# Patient Record
Sex: FEMALE | Race: WHITE | NOT HISPANIC OR LATINO | Employment: FULL TIME | URBAN - METROPOLITAN AREA
[De-identification: names, ages, dates, MRNs, and addresses within clinical notes are randomized per-mention and may not be internally consistent; named-entity substitution may affect disease eponyms.]

---

## 2017-07-25 ENCOUNTER — NON-PROVIDER VISIT (OUTPATIENT)
Dept: URGENT CARE | Facility: CLINIC | Age: 32
End: 2017-07-25

## 2017-07-25 DIAGNOSIS — Z11.1 ENCOUNTER FOR PPD SKIN TEST READING: ICD-10-CM

## 2017-07-25 PROCEDURE — 86580 TB INTRADERMAL TEST: CPT | Performed by: NURSE PRACTITIONER

## 2017-07-25 NOTE — MR AVS SNAPSHOT
Ericka Flores   2017 1:45 PM   Non-Provider Visit   MRN: [id number]    Department:  [facility]   Dept Phone:  [phone]    Description:  Female : 1985   Provider:  [facility]           Reason for Visit     PPD Placement           Allergies as of 2017     No Known Allergies      You were diagnosed with     Encounter for PPD skin test reading   [[id number]]         Vital Signs     Smoking Status                   Never Smoker            Basic Information     Date Of Birth Sex Race Ethnicity Preferred Language    1985 Female White Non- English      Health Maintenance        Date Due Completion Dates    IMM DTaP/Tdap/Td Vaccine (1 - Tdap) 3/3/2002 ---    PAP SMEAR 3/3/2004 ---    IMM INFLUENZA (1) 2017 ---            Current Immunizations     Tuberculin Skin Test 2017 11:45 PM, 8/3/2015, 2013  1:50 PM      Below and/or attached are the medications your provider expects you to take. Review all of your home medications and newly ordered medications with your provider and/or pharmacist. Follow medication instructions as directed by your provider and/or pharmacist. Please keep your medication list with you and share with your provider. Update the information when medications are discontinued, doses are changed, or new medications (including over-the-counter products) are added; and carry medication information at all times in the event of emergency situations     Allergies:  No Known Allergies          Medications  Valid as of: 2017 -  2:37 PM    Generic Name Brand Name Tablet Size Instructions for use    Etonogestrel-Ethinyl Estradiol (RING) NUVARING 0.12-0.015 MG/24HR Insert 1 Each in vagina.        Hydrocodone-Acetaminophen (Tab) NORCO 5-325 MG Take 1-2 Tabs by mouth every 6 hours as needed.        Montelukast Sodium (Tab) SINGULAIR 10 MG Take one tablet before bedtime        Omeprazole (CAPSULE DELAYED RELEASE) PRILOSEC 20 MG Take 20 mg by mouth  every day.        .                 Medicines prescribed today were sent to:     [id number] - [place], [place] - [address] [place] [address]    Phone: [phone] Fax: [phone]    Open 24 Hours?: No      Medication refill instructions:       If your prescription bottle indicates you have medication refills left, it is not necessary to call your provider’s office. Please contact your pharmacy and they will refill your medication.    If your prescription bottle indicates you do not have any refills left, you may request refills at any time through one of the following ways: The online CreditCardsOnline system (except Urgent Care), by calling your provider’s office, or by asking your pharmacy to contact your provider’s office with a refill request. Medication refills are processed only during regular business hours and may not be available until the next business day. Your provider may request additional information or to have a follow-up visit with you prior to refilling your medication.   *Please Note: Medication refills are assigned a new Rx number when refilled electronically. Your pharmacy may indicate that no refills were authorized even though a new prescription for the same medication is available at the pharmacy. Please request the medicine by name with the pharmacy before contacting your provider for a refill.           CreditCardsOnline Access Code: [email or web address]  Expires: 8/24/2017  2:37 PM    Your email address is not on file at [facility].  Email Addresses are required for you to sign up for CreditCardsOnline, please contact [phone] to verify your personal information and to provide your email address prior to attempting to register for CreditCardsOnline.    Ericka Flores  [address] [place] [address]    CreditCardsOnline  A secure, online tool to manage your health information     [facility]’s CreditCardsOnline® is a secure, online tool that connects you to your personalized health information from the privacy of  your home -- day or night - making it very easy for you to manage your healthcare. Once the activation process is completed, you can even access your medical information using the [employer] so, which is available for free in the Apple So store or Google Play store.     To learn more about [employer], visit [email or web address]    There are two levels of access available (as shown below):   My Chart Features  [facility]  Urgent  Care Non-[facility] Doctor   Email your healthcare team securely and privately 24/7 X X X    Manage appointments: schedule your next appointment; view details of past/upcoming appointments X      Request prescription refills. X      View recent personal medical records, including lab and immunizations X X X X   View health record, including health history, allergies, medications X X X X   Read reports about your outpatient visits, procedures, consult and ER notes X X X X   See your discharge summary, which is a recap of your hospital and/or ER visit that includes your diagnosis, lab results, and care plan X X  X     How to register for [employer]:  Once your e-mail address has been verified, follow the following steps to sign up for [employer].     1. Go to  [email or web address]  2. Click on the Sign Up Now box, which takes you to the New Member Sign Up page. You will need to provide the following information:  a. Enter your [employer] Access Code exactly as it appears at the top of this page. (You will not need to use this code after you’ve completed the sign-up process. If you do not sign up before the expiration date, you must request a new code.)   b. Enter your date of birth.   c. Enter your home email address.   d. Click Submit, and follow the next screen’s instructions.  3. Create a Deskarmat ID. This will be your [employer] login ID and cannot be changed, so think of one that is secure and easy to remember.  4. Create a [employer] password. You  can change your password at any time.  5. Enter your Password Reset Question and Answer. This can be used at a later time if you forget your password.   6. Enter your e-mail address. This allows you to receive e-mail notifications when new information is available in [employer].  7. Click Sign Up. You can now view your health information.    For assistance activating your [employer] account, call [phone]

## 2017-07-27 ENCOUNTER — NON-PROVIDER VISIT (OUTPATIENT)
Dept: URGENT CARE | Facility: CLINIC | Age: 32
End: 2017-07-27

## 2017-07-27 LAB — TB WHEAL 3D P 5 TU DIAM: 0 MM

## 2018-05-30 ENCOUNTER — HOSPITAL ENCOUNTER (OUTPATIENT)
Dept: LAB | Facility: MEDICAL CENTER | Age: 33
End: 2018-05-30
Attending: OBSTETRICS & GYNECOLOGY
Payer: COMMERCIAL

## 2018-05-30 PROCEDURE — 88175 CYTOPATH C/V AUTO FLUID REDO: CPT

## 2018-05-30 PROCEDURE — 87591 N.GONORRHOEAE DNA AMP PROB: CPT

## 2018-05-30 PROCEDURE — 87491 CHLMYD TRACH DNA AMP PROBE: CPT

## 2018-06-02 LAB
C TRACH DNA GENITAL QL NAA+PROBE: NEGATIVE
CYTOLOGY REG CYTOL: NORMAL
N GONORRHOEA DNA GENITAL QL NAA+PROBE: NEGATIVE
SPECIMEN SOURCE: NORMAL

## 2018-06-05 ENCOUNTER — HOSPITAL ENCOUNTER (OUTPATIENT)
Facility: MEDICAL CENTER | Age: 33
End: 2018-06-05
Attending: OBSTETRICS & GYNECOLOGY | Admitting: OBSTETRICS & GYNECOLOGY
Payer: COMMERCIAL

## 2018-06-05 VITALS
BODY MASS INDEX: 41.47 KG/M2 | WEIGHT: 248.9 LBS | OXYGEN SATURATION: 95 % | RESPIRATION RATE: 18 BRPM | TEMPERATURE: 98 F | HEIGHT: 65 IN | HEART RATE: 89 BPM

## 2018-06-05 PROBLEM — O02.1 MISSED ABORTION WITH FETAL DEMISE BEFORE 20 COMPLETED WEEKS OF GESTATION: Status: ACTIVE | Noted: 2018-06-05

## 2018-06-05 PROBLEM — N88.2 CERVICAL STENOSIS (UTERINE CERVIX): Status: ACTIVE | Noted: 2018-06-05

## 2018-06-05 PROBLEM — N99.71 UTERINE PERFORATION BY UTERINE SOUND: Status: ACTIVE | Noted: 2018-06-05

## 2018-06-05 LAB
ERYTHROCYTE [DISTWIDTH] IN BLOOD BY AUTOMATED COUNT: 39.3 FL (ref 35.9–50)
HCT VFR BLD AUTO: 43.5 % (ref 37–47)
HGB BLD-MCNC: 14.9 G/DL (ref 12–16)
MCH RBC QN AUTO: 30.4 PG (ref 27–33)
MCHC RBC AUTO-ENTMCNC: 34.3 G/DL (ref 33.6–35)
MCV RBC AUTO: 88.8 FL (ref 81.4–97.8)
PLATELET # BLD AUTO: 329 K/UL (ref 164–446)
PMV BLD AUTO: 8.6 FL (ref 9–12.9)
RBC # BLD AUTO: 4.9 M/UL (ref 4.2–5.4)
WBC # BLD AUTO: 7.8 K/UL (ref 4.8–10.8)

## 2018-06-05 PROCEDURE — 700102 HCHG RX REV CODE 250 W/ 637 OVERRIDE(OP)

## 2018-06-05 PROCEDURE — 500448 HCHG DRESSING, TELFA 3X4: Performed by: OBSTETRICS & GYNECOLOGY

## 2018-06-05 PROCEDURE — 700101 HCHG RX REV CODE 250

## 2018-06-05 PROCEDURE — 85027 COMPLETE CBC AUTOMATED: CPT

## 2018-06-05 PROCEDURE — 160002 HCHG RECOVERY MINUTES (STAT): Performed by: OBSTETRICS & GYNECOLOGY

## 2018-06-05 PROCEDURE — 160035 HCHG PACU - 1ST 60 MINS PHASE I: Performed by: OBSTETRICS & GYNECOLOGY

## 2018-06-05 PROCEDURE — 700111 HCHG RX REV CODE 636 W/ 250 OVERRIDE (IP)

## 2018-06-05 PROCEDURE — 160036 HCHG PACU - EA ADDL 30 MINS PHASE I: Performed by: OBSTETRICS & GYNECOLOGY

## 2018-06-05 PROCEDURE — 160009 HCHG ANES TIME/MIN: Performed by: OBSTETRICS & GYNECOLOGY

## 2018-06-05 PROCEDURE — 502587 HCHG PACK, D&C: Performed by: OBSTETRICS & GYNECOLOGY

## 2018-06-05 PROCEDURE — 501330 HCHG SET, CYSTO IRRIG TUBING: Performed by: OBSTETRICS & GYNECOLOGY

## 2018-06-05 PROCEDURE — A9270 NON-COVERED ITEM OR SERVICE: HCPCS

## 2018-06-05 PROCEDURE — 160039 HCHG SURGERY MINUTES - EA ADDL 1 MIN LEVEL 3: Performed by: OBSTETRICS & GYNECOLOGY

## 2018-06-05 PROCEDURE — 36415 COLL VENOUS BLD VENIPUNCTURE: CPT

## 2018-06-05 PROCEDURE — 160028 HCHG SURGERY MINUTES - 1ST 30 MINS LEVEL 3: Performed by: OBSTETRICS & GYNECOLOGY

## 2018-06-05 PROCEDURE — 160048 HCHG OR STATISTICAL LEVEL 1-5: Performed by: OBSTETRICS & GYNECOLOGY

## 2018-06-05 RX ORDER — METHYLERGONOVINE MALEATE 0.2 MG/ML
INJECTION INTRAVENOUS
Status: DISCONTINUED | OUTPATIENT
Start: 2018-06-05 | End: 2018-06-05 | Stop reason: HOSPADM

## 2018-06-05 RX ORDER — LIDOCAINE HYDROCHLORIDE 10 MG/ML
0.5 INJECTION, SOLUTION INFILTRATION; PERINEURAL
Status: DISCONTINUED | OUTPATIENT
Start: 2018-06-05 | End: 2018-06-05

## 2018-06-05 RX ORDER — ACETAMINOPHEN 500 MG
1000 TABLET ORAL EVERY 6 HOURS PRN
Qty: 30 TAB | Refills: 0 | COMMUNITY
Start: 2018-06-05

## 2018-06-05 RX ORDER — LIDOCAINE HYDROCHLORIDE 10 MG/ML
INJECTION, SOLUTION INFILTRATION; PERINEURAL
Status: COMPLETED
Start: 2018-06-05 | End: 2018-06-05

## 2018-06-05 RX ORDER — CETIRIZINE HYDROCHLORIDE 10 MG/1
10 TABLET ORAL DAILY
COMMUNITY

## 2018-06-05 RX ORDER — OXYTOCIN 10 [USP'U]/ML
INJECTION, SOLUTION INTRAMUSCULAR; INTRAVENOUS
Status: DISCONTINUED
Start: 2018-06-05 | End: 2018-06-05 | Stop reason: HOSPADM

## 2018-06-05 RX ORDER — BUPIVACAINE HYDROCHLORIDE AND EPINEPHRINE 2.5; 5 MG/ML; UG/ML
INJECTION, SOLUTION EPIDURAL; INFILTRATION; INTRACAUDAL; PERINEURAL
Status: DISCONTINUED | OUTPATIENT
Start: 2018-06-05 | End: 2018-06-05 | Stop reason: HOSPADM

## 2018-06-05 RX ORDER — OXYCODONE HYDROCHLORIDE 5 MG/1
5 TABLET ORAL
Status: DISCONTINUED | OUTPATIENT
Start: 2018-06-05 | End: 2018-06-05 | Stop reason: HOSPADM

## 2018-06-05 RX ORDER — BUPIVACAINE HYDROCHLORIDE 2.5 MG/ML
INJECTION, SOLUTION EPIDURAL; INFILTRATION; INTRACAUDAL
Status: DISCONTINUED
Start: 2018-06-05 | End: 2018-06-05 | Stop reason: HOSPADM

## 2018-06-05 RX ORDER — CELECOXIB 200 MG/1
CAPSULE ORAL
Status: COMPLETED
Start: 2018-06-05 | End: 2018-06-05

## 2018-06-05 RX ORDER — IBUPROFEN 600 MG/1
600 TABLET ORAL EVERY 6 HOURS PRN
Qty: 30 TAB
Start: 2018-06-05

## 2018-06-05 RX ORDER — GABAPENTIN 300 MG/1
CAPSULE ORAL
Status: COMPLETED
Start: 2018-06-05 | End: 2018-06-05

## 2018-06-05 RX ORDER — METHYLERGONOVINE MALEATE 0.2 MG/ML
INJECTION INTRAVENOUS
Status: DISCONTINUED
Start: 2018-06-05 | End: 2018-06-05 | Stop reason: HOSPADM

## 2018-06-05 RX ORDER — MISOPROSTOL 200 UG/1
TABLET ORAL
Status: DISCONTINUED
Start: 2018-06-05 | End: 2018-06-05 | Stop reason: HOSPADM

## 2018-06-05 RX ORDER — IBUPROFEN 600 MG/1
600 TABLET ORAL EVERY 6 HOURS PRN
Status: DISCONTINUED | OUTPATIENT
Start: 2018-06-05 | End: 2018-06-05 | Stop reason: HOSPADM

## 2018-06-05 RX ORDER — ACETAMINOPHEN 500 MG
1000 TABLET ORAL EVERY 6 HOURS PRN
Status: DISCONTINUED | OUTPATIENT
Start: 2018-06-05 | End: 2018-06-05 | Stop reason: HOSPADM

## 2018-06-05 RX ORDER — OXYCODONE HYDROCHLORIDE 5 MG/1
2.5 TABLET ORAL
Status: DISCONTINUED | OUTPATIENT
Start: 2018-06-05 | End: 2018-06-05 | Stop reason: HOSPADM

## 2018-06-05 RX ORDER — ONDANSETRON 2 MG/ML
4 INJECTION INTRAMUSCULAR; INTRAVENOUS EVERY 6 HOURS PRN
Status: DISCONTINUED | OUTPATIENT
Start: 2018-06-05 | End: 2018-06-05 | Stop reason: HOSPADM

## 2018-06-05 RX ORDER — SODIUM CHLORIDE, SODIUM LACTATE, POTASSIUM CHLORIDE, CALCIUM CHLORIDE 600; 310; 30; 20 MG/100ML; MG/100ML; MG/100ML; MG/100ML
INJECTION, SOLUTION INTRAVENOUS CONTINUOUS
Status: DISCONTINUED | OUTPATIENT
Start: 2018-06-05 | End: 2018-06-05

## 2018-06-05 RX ORDER — ACETAMINOPHEN 500 MG
TABLET ORAL
Status: COMPLETED
Start: 2018-06-05 | End: 2018-06-05

## 2018-06-05 RX ADMIN — SODIUM CHLORIDE, SODIUM LACTATE, POTASSIUM CHLORIDE, CALCIUM CHLORIDE: 600; 310; 30; 20 INJECTION, SOLUTION INTRAVENOUS at 11:25

## 2018-06-05 RX ADMIN — CELECOXIB 400 MG: 200 CAPSULE ORAL at 11:24

## 2018-06-05 RX ADMIN — GABAPENTIN 300 MG: 300 CAPSULE ORAL at 11:24

## 2018-06-05 RX ADMIN — LIDOCAINE HYDROCHLORIDE 0.5 ML: 10 INJECTION, SOLUTION INFILTRATION; PERINEURAL at 11:24

## 2018-06-05 RX ADMIN — ACETAMINOPHEN 1000 MG: 500 TABLET, FILM COATED ORAL at 11:24

## 2018-06-05 ASSESSMENT — PAIN SCALES - GENERAL
PAINLEVEL_OUTOF10: 0
PAINLEVEL_OUTOF10: 2
PAINLEVEL_OUTOF10: 0
PAINLEVEL_OUTOF10: 0
PAINLEVEL_OUTOF10: 2

## 2018-06-05 NOTE — OP REPORT
DATE OF SERVICE:  2018    OPERATIONS:  1.  Attempted cervical dilatation.  2.  Attempted diagnostic hysteroscopy.  3.  Successful diagnostic cystoscopy.    SURGEON:  Juan Gallardo MD.    ASSISTANT:  None.    ANESTHESIOLOGIST:  Yoandy Potts MD    ANESTHESIA:  General.    PREOPERATIVE DIAGNOSES:  1.  An 8-week intrauterine fetal demise.  2.  Missed .  3.  Recurrent pregnancy loss.    POSTOPERATIVE DIAGNOSES:  1.  An 8-week intrauterine fetal demise.  2.  Missed .  3.  Recurrent pregnancy loss.  4.  Extremely stenotic cervix.  5.  Suspected midline uterine perforation.  6.  Continuing non-viable intra-uterine pregnancy    COMPLICATIONS:  Suspected midline uterine perforation.    SPECIMENS SENT TO PATHOLOGY:  None.    ESTIMATED BLOOD LOSS:  5 mL (primarily from cervical manipulation).    ANTIBIOTIC PROPHYLAXIS:  Ancef 2 g IV was given.    BACKGROUND/CONSENT:  This 33-year-old lady is .  I met her very recently,   last week, for a new OB appointment and we were disappointed to find   intrauterine fetal demise with a fundal sac, a single embryo, crown rump   length 16.9 mm, consistent with 8-1/7 weeks, and no fetal cardiac activity was   evident by any ultrasound or Doppler modality.  She elected surgical uterine   evacuation after discussing the options.  I had no reason to suspect severe   cervical stenosis, as her only prior surgery was dilatation and evacuation for   Incomplete first trimester loss in 2017.  She has no prior history of   LEEP or cone biopsy.    OPERATION:  The patient went to the OR.  General anesthesia was administered.    We placed her lower legs in padded Scott universal stirrups with her thighs   slightly flexed.  Bimanual exam under anesthesia revealed a small freely   mobile midline uterine axis, with a closed cervix.    The patient was prepped and draped.  Time-out was done.  I placed a weighted   speculum vaginally, I applied traction to the  anterior cervix with a   tenaculum, I injected 10 mL 0.25% Marcaine with epinephrine in the form of   paracervical block.  Her external cervical os was visible, but extremely   stenotic.  I did my best to try to navigate a sound and small dilators through   the cervical canal while applying traction with  the tenaculum.  I never   achieved the sensation that I usually associate with traversing the internal   os of the cervix.  I did pass a sound to a depth of 12 cm, but the sound did   not meet what I considered an appropriate amount of resistance at an   acceptable depth.  I suspected midline uterine perforation, so of course, I   did not introduce any suction instruments or any larger dilators or curettes.  The OR   staff quickly assembled equipment for a diagnostic hysteroscopy and   cystoscopy.  With saline as the distending medium, I introduced the   hysteroscope along the path that my dilator and sound were following.  I did   Visualize at least part of the cervical canal, but past the cervical canal I never   satisfactorily visualized the uterine cavity---I believe I was inspecting   myometrium, and I think the dilator  had been   Dissecting into myometrium.  I never saw the suspected  perforation   with the hysteroscope (ie I never saw the peritoneal cavity).    Attempts at hysteroscopy were abandoned.    Diagnostic cystoscopy revealed a completely normal bladder with no evidence of   perforation or trauma.    Because I strongly suspected midline perforation and the patient had   completely normal stable vital signs, I elected to not perform diagnostic   laparoscopy.  All instruments were removed.  I placed a hemostatic   figure-of-eight 2-0 Vicryl suture across the tenaculum site.  I would like to   check a CBC in 1 hour, observe her for about 4 hours prior to discharge.  I   would like to see her in the office tomorrow for ultrasound to assess the   status of the pregnancy, and discussion of the options of  waiting for a   spontaneous miscarriage or medical assisted termination.  I have discussed my   findings, my suspected uterine perforation, and it management strategy with   both her mother and her , including diagrams.       ____________________________________     MD ANIRUDH GALINDO / SHAMA    DD:  06/05/2018 13:30:09  DT:  06/05/2018 16:08:40    D#:  [id number]  Job#:  [id number]    cc: NICHOLAS HERNANDEZ MD

## 2018-06-05 NOTE — OR NURSING
1249   Pt received to pacu, asleep with oral airway in place and spontaneous respirations noted.  O2 applied and no distress noted. Report received from anesthesia.  Vital signs taken and stable.      1215 Pt awake but sleepy, vitals stable. Pt denies pain. Handoff report to Amber JONES.    1245  Report received and care assumed. Pt awake and crying, sitting up in recliner chair. Dr. Gallardo was just here and spoke with patient.    1400  CBC drawn by lab as ordered. Pt denies pain, vaginal bleeding within normal limits.    1430  CBC results normal, message left at Sami Adam's office. Pt visiting with mother at bedside. Orders received to observe patient until 5pm, then may discharge. Pt up to bathroom, states vaginal bleeding is minimal, normal per patient.    1500  Handoff report to Jahaira JONES.

## 2018-06-05 NOTE — OR NURSING
1500 Handoff report received from ROBERT Borja. Patient resting in recliner, visiting with mom and reading magazine.    1600 Denies c/o's, reading magazine.     1642 States is having mild uterine cramping, light vaginal flow. Up to bathroom and voided, dressed for discharge.    1700 Discharge instructions reviewed with patient and her mother. They verbalized understanding. Patient discharged per ambulatory with belongings and discharge instructions. Mom in accompaniment.

## 2018-06-05 NOTE — DISCHARGE INSTRUCTIONS
ACTIVITY: Rest and take it easy for the first 24 hours.  A responsible adult is recommended to remain with you during that time.  It is normal to feel sleepy.  We encourage you to not do anything that requires balance, judgment or coordination.    MILD FLU-LIKE SYMPTOMS ARE NORMAL. YOU MAY EXPERIENCE GENERALIZED MUSCLE ACHES, THROAT IRRITATION, HEADACHE AND/OR SOME NAUSEA.    FOR 24 HOURS DO NOT:  Drive, operate machinery or run household appliances.  Drink beer or alcoholic beverages.   Make important decisions or sign legal documents.    SPECIAL INSTRUCTIONS: follow printed post-operative instruction sheet    DIET: To avoid nausea, slowly advance diet as tolerated, avoiding spicy or greasy foods for the first day.  Add more substantial food to your diet according to your physician's instructions.  Babies can be fed formula or breast milk as soon as they are hungry.  INCREASE FLUIDS AND FIBER TO AVOID CONSTIPATION.    FOLLOW-UP APPOINTMENT:  A follow-up appointment should be arranged with your doctor tomorrow as scheduled; call to schedule.    You should CALL YOUR PHYSICIAN if you develop:  Fever greater than 101 degrees F.  Pain not relieved by medication, or persistent nausea or vomiting.  Excessive bleeding (blood soaking through dressing) or unexpected drainage from the wound.  Extreme redness or swelling around the incision site, drainage of pus or foul smelling drainage.  Inability to urinate or empty your bladder within 8 hours.  Problems with breathing or chest pain.    You should call 911 if you develop problems with breathing or chest pain.  If you are unable to contact your doctor or surgical center, you should go to the nearest emergency room or urgent care center.  Physician's telephone #: [phone]    If any questions arise, call your doctor.  If your doctor is not available, please feel free to call the Surgical Center at [phone].  The Center is open Monday through Friday from 7AM to 7PM.  You  can also call the HEALTH HOTLINE open 24 hours/day, 7 days/week and speak to a nurse at [phone], or toll free at [phone].    A registered nurse may call you a few days after your surgery to see how you are doing after your procedure.    MEDICATIONS: Resume taking daily medication.  Take prescribed pain medication with food.  If no medication is prescribed, you may take non-aspirin pain medication if needed.    Depression / Suicide Risk    As you are discharged from this [facility] facility, it is important to learn how to keep safe from harming yourself.    Recognize the warning signs:  · Abrupt changes in personality, positive or negative- including increase in energy   · Giving away possessions  · Change in eating patterns- significant weight changes-  positive or negative  · Change in sleeping patterns- unable to sleep or sleeping all the time   · Unwillingness or inability to communicate  · Depression  · Unusual sadness, discouragement and loneliness  · Talk of wanting to die  · Neglect of personal appearance   · Rebelliousness- reckless behavior  · Withdrawal from people/activities they love  · Confusion- inability to concentrate     If you or a loved one observes any of these behaviors or has concerns about self-harm, here's what you can do:  · Talk about it- your feelings and reasons for harming yourself  · Remove any means that you might use to hurt yourself (examples: pills, rope, extension cords, firearm)  · Get professional help from the community (Mental Health, Substance Abuse, psychological counseling)  · Do not be alone:Call your Safe Contact- someone whom you trust who will be there for you.  · Call your local CRISIS HOTLINE 603-8835 or 168-300-6749  · Call your local Children's Mobile Crisis Response Team [place] (798) 123-3870 or www.ABS  · Call the toll free National Suicide Prevention Hotlines   · National Suicide Prevention Lifeline 975-347-CGBF  (4393)  · [facility] 800-SUICIDE (403-6339)

## 2018-06-05 NOTE — OR NURSING
1315- assumed care for Rn break pt states pain is minimal without nausea felt like going to bathroom-assisted up to bathroom was able to void. Given some po fluids. Declines need for pain meds at this time.

## 2018-06-05 NOTE — OR SURGEON
Immediate Post OP Note    PreOp Diagnosis:     8 week IUFD  Missed     PostOp Diagnosis:     Same, plus  Extremely stenotic cervix  Suspected midline uterine perforation    Procedure(s):  ATTEMPTED DILATION, DIAGNOSTIC HYSTEROSCOPY, DIAGNOSTIC CYSTOSCOPY (Uterine evacuation not accomplished) - Wound Class: Dirty or Infected    Surgeon(s):  Juan Gallardo M.D.    Anesthesiologist/Type of Anesthesia:  Anesthesiologist: Yoandy Potts M.D./General    Surgical Staff:  Circulator: Purnima Ravi R.N.  Scrub Person: Ruthy Toney    Specimens removed if any:  none    Estimated Blood Loss: 5 cc (from cervical manipulation)    Findings: extremely stenotic cervix; sound did not meet resistance at appropriate depth; diagnostic cystoscopy did not reveal a uterine cavity; diagnostic cystoscopy revealed intact bladder; laparoscopy not done as vital signs stable,    Complications: suspect midline uterine perforation    Ancef 2 grams given.    Will check CBC in one hour, observe for 4 hours, DC home if stable, see in office tomorrow for ultrasound and discussion of options (await spontaneous AB of mifepristone/misoprostol medical termination).        2018 1:01 PM Juan Gallardo M.D.

## 2019-05-14 ENCOUNTER — HOSPITAL ENCOUNTER (OUTPATIENT)
Dept: LAB | Facility: MEDICAL CENTER | Age: 34
End: 2019-05-14
Attending: OBSTETRICS & GYNECOLOGY
Payer: COMMERCIAL

## 2019-05-14 ENCOUNTER — HOSPITAL ENCOUNTER (OUTPATIENT)
Facility: MEDICAL CENTER | Age: 34
End: 2019-05-14
Attending: OBSTETRICS & GYNECOLOGY
Payer: COMMERCIAL

## 2019-05-14 PROCEDURE — 88142 CYTOPATH C/V THIN LAYER: CPT

## 2019-05-14 PROCEDURE — 369999 HCHG MISC LAB CHARGE

## 2019-05-16 LAB
C TRACH DNA GENITAL QL NAA+PROBE: NEGATIVE
N GONORRHOEA DNA GENITAL QL NAA+PROBE: NEGATIVE
SPECIMEN SOURCE: NORMAL

## 2019-05-29 ENCOUNTER — HOSPITAL ENCOUNTER (OUTPATIENT)
Dept: LAB | Facility: MEDICAL CENTER | Age: 34
End: 2019-05-29
Attending: OBSTETRICS & GYNECOLOGY
Payer: COMMERCIAL

## 2019-05-29 LAB
ABO GROUP BLD: NORMAL
BASOPHILS # BLD AUTO: 0.7 % (ref 0–1.8)
BASOPHILS # BLD: 0.05 K/UL (ref 0–0.12)
BLD GP AB SCN SERPL QL: NORMAL
EOSINOPHIL # BLD AUTO: 0.09 K/UL (ref 0–0.51)
EOSINOPHIL NFR BLD: 1.2 % (ref 0–6.9)
ERYTHROCYTE [DISTWIDTH] IN BLOOD BY AUTOMATED COUNT: 39 FL (ref 35.9–50)
HBV SURFACE AG SER QL: NEGATIVE
HCT VFR BLD AUTO: 41.4 % (ref 37–47)
HCV AB SER QL: NEGATIVE
HGB BLD-MCNC: 14.2 G/DL (ref 12–16)
HIV 1+2 AB+HIV1 P24 AG SERPL QL IA: NON REACTIVE
IMM GRANULOCYTES # BLD AUTO: 0.01 K/UL (ref 0–0.11)
IMM GRANULOCYTES NFR BLD AUTO: 0.1 % (ref 0–0.9)
LYMPHOCYTES # BLD AUTO: 2.72 K/UL (ref 1–4.8)
LYMPHOCYTES NFR BLD: 37.2 % (ref 22–41)
MCH RBC QN AUTO: 30.1 PG (ref 27–33)
MCHC RBC AUTO-ENTMCNC: 34.3 G/DL (ref 33.6–35)
MCV RBC AUTO: 87.7 FL (ref 81.4–97.8)
MONOCYTES # BLD AUTO: 0.46 K/UL (ref 0–0.85)
MONOCYTES NFR BLD AUTO: 6.3 % (ref 0–13.4)
NEUTROPHILS # BLD AUTO: 3.98 K/UL (ref 2–7.15)
NEUTROPHILS NFR BLD: 54.5 % (ref 44–72)
NRBC # BLD AUTO: 0 K/UL
NRBC BLD-RTO: 0 /100 WBC
PLATELET # BLD AUTO: 363 K/UL (ref 164–446)
PMV BLD AUTO: 9.4 FL (ref 9–12.9)
RBC # BLD AUTO: 4.72 M/UL (ref 4.2–5.4)
RH BLD: NORMAL
RUBV AB SER QL: 55.4 IU/ML
TREPONEMA PALLIDUM IGG+IGM AB [PRESENCE] IN SERUM OR PLASMA BY IMMUNOASSAY: NON REACTIVE
WBC # BLD AUTO: 7.3 K/UL (ref 4.8–10.8)

## 2019-05-29 PROCEDURE — 86803 HEPATITIS C AB TEST: CPT

## 2019-05-29 PROCEDURE — 81420 FETAL CHRMOML ANEUPLOIDY: CPT

## 2019-05-29 PROCEDURE — 86900 BLOOD TYPING SEROLOGIC ABO: CPT

## 2019-05-29 PROCEDURE — 87389 HIV-1 AG W/HIV-1&-2 AB AG IA: CPT

## 2019-05-29 PROCEDURE — 86850 RBC ANTIBODY SCREEN: CPT

## 2019-05-29 PROCEDURE — 86762 RUBELLA ANTIBODY: CPT

## 2019-05-29 PROCEDURE — 87340 HEPATITIS B SURFACE AG IA: CPT

## 2019-05-29 PROCEDURE — 36415 COLL VENOUS BLD VENIPUNCTURE: CPT

## 2019-05-29 PROCEDURE — 81220 CFTR GENE COM VARIANTS: CPT

## 2019-05-29 PROCEDURE — 86901 BLOOD TYPING SEROLOGIC RH(D): CPT

## 2019-05-29 PROCEDURE — 87086 URINE CULTURE/COLONY COUNT: CPT

## 2019-05-29 PROCEDURE — 85025 COMPLETE CBC W/AUTO DIFF WBC: CPT

## 2019-05-29 PROCEDURE — 86780 TREPONEMA PALLIDUM: CPT

## 2019-05-31 LAB
BACTERIA UR CULT: NORMAL
SIGNIFICANT IND 70042: NORMAL
SITE SITE: NORMAL
SOURCE SOURCE: NORMAL

## 2019-06-02 LAB
CF EXPANDED VARIANT PANEL INTERP Q4864: NORMAL
CFTR ALLELE 1 BLD/T QL: NEGATIVE
CFTR ALLELE 1 BLD/T QL: NEGATIVE
CFTR MUT ANL BLD/T: NORMAL

## 2019-06-03 LAB — TEST NAME 95000: NORMAL

## 2019-06-10 LAB
ANNOTATION COMMENT IMP: NORMAL
FET CHR X + Y ANEUP RISK PLAS.CFDNA QN: NORMAL
FET SEX US: NORMAL
FET TS 13 RISK PLAS.CFDNA QL: NORMAL
FET TS 18 RISK PLAS.CFDNA QL: NORMAL
FET TS 21 RISK PLAS.CFDNA QL: NORMAL
FETAL FRACTION Q0204: 3.1
GA (DAYS): 0 D
GA (WEEKS): 11 WK
PATHOLOGY STUDY: NORMAL
SERVICE CMNT-IMP: NO
TRIPLOIDY VAN TWIN Q0202: NORMAL

## 2019-08-30 ENCOUNTER — HOSPITAL ENCOUNTER (OUTPATIENT)
Dept: LAB | Facility: MEDICAL CENTER | Age: 34
End: 2019-08-30
Attending: OBSTETRICS & GYNECOLOGY
Payer: COMMERCIAL

## 2019-08-30 LAB
GLUCOSE 1H P 50 G GLC PO SERPL-MCNC: 92 MG/DL (ref 70–139)
GLUCOSE 1H P CHAL SERPL-MCNC: 92 MG/DL
GLUCOSE 1H P CHAL SERPL-MCNC: 92 MG/DL
HCT VFR BLD AUTO: 37.2 % (ref 37–47)
HGB BLD-MCNC: 12.5 G/DL (ref 12–16)
PLATELET # BLD AUTO: 299 K/UL (ref 164–446)
TREPONEMA PALLIDUM IGG+IGM AB [PRESENCE] IN SERUM OR PLASMA BY IMMUNOASSAY: NON REACTIVE

## 2019-08-30 PROCEDURE — 86780 TREPONEMA PALLIDUM: CPT

## 2019-08-30 PROCEDURE — 85018 HEMOGLOBIN: CPT

## 2019-08-30 PROCEDURE — 85049 AUTOMATED PLATELET COUNT: CPT

## 2019-08-30 PROCEDURE — 82950 GLUCOSE TEST: CPT

## 2019-08-30 PROCEDURE — 36415 COLL VENOUS BLD VENIPUNCTURE: CPT

## 2019-08-30 PROCEDURE — 85014 HEMATOCRIT: CPT

## 2019-11-07 ENCOUNTER — HOSPITAL ENCOUNTER (OUTPATIENT)
Dept: LAB | Facility: MEDICAL CENTER | Age: 34
End: 2019-11-07
Attending: OBSTETRICS & GYNECOLOGY
Payer: COMMERCIAL

## 2019-11-07 LAB — STREP GP B DNA PCR: NEGATIVE

## 2019-11-07 PROCEDURE — 87150 DNA/RNA AMPLIFIED PROBE: CPT

## 2019-11-07 PROCEDURE — 87081 CULTURE SCREEN ONLY: CPT

## 2019-11-08 LAB — GP B STREP DNA SPEC QL NAA+PROBE: NEGATIVE

## 2019-12-07 LAB — STREP GP B DNA PCR: NEGATIVE

## 2019-12-11 ENCOUNTER — APPOINTMENT (OUTPATIENT)
Dept: OBGYN | Facility: MEDICAL CENTER | Age: 34
End: 2019-12-11
Attending: OBSTETRICS & GYNECOLOGY
Payer: COMMERCIAL

## 2019-12-11 ENCOUNTER — HOSPITAL ENCOUNTER (INPATIENT)
Facility: MEDICAL CENTER | Age: 34
LOS: 2 days | End: 2019-12-13
Attending: OBSTETRICS & GYNECOLOGY | Admitting: OBSTETRICS & GYNECOLOGY
Payer: COMMERCIAL

## 2019-12-11 LAB
ALBUMIN SERPL BCP-MCNC: 3.7 G/DL (ref 3.2–4.9)
ALBUMIN/GLOB SERPL: 1.2 G/DL
ALP SERPL-CCNC: 153 U/L (ref 30–99)
ALT SERPL-CCNC: 16 U/L (ref 2–50)
ANION GAP SERPL CALC-SCNC: 13 MMOL/L (ref 0–11.9)
AST SERPL-CCNC: 18 U/L (ref 12–45)
BASOPHILS # BLD AUTO: 0.4 % (ref 0–1.8)
BASOPHILS # BLD: 0.03 K/UL (ref 0–0.12)
BILIRUB SERPL-MCNC: 0.5 MG/DL (ref 0.1–1.5)
BUN SERPL-MCNC: 11 MG/DL (ref 8–22)
CALCIUM SERPL-MCNC: 8.9 MG/DL (ref 8.5–10.5)
CHLORIDE SERPL-SCNC: 104 MMOL/L (ref 96–112)
CO2 SERPL-SCNC: 20 MMOL/L (ref 20–33)
CREAT SERPL-MCNC: 0.66 MG/DL (ref 0.5–1.4)
EOSINOPHIL # BLD AUTO: 0.06 K/UL (ref 0–0.51)
EOSINOPHIL NFR BLD: 0.8 % (ref 0–6.9)
ERYTHROCYTE [DISTWIDTH] IN BLOOD BY AUTOMATED COUNT: 40.6 FL (ref 35.9–50)
GLOBULIN SER CALC-MCNC: 3.2 G/DL (ref 1.9–3.5)
GLUCOSE SERPL-MCNC: 113 MG/DL (ref 65–99)
HCT VFR BLD AUTO: 37.4 % (ref 37–47)
HGB BLD-MCNC: 13 G/DL (ref 12–16)
HOLDING TUBE BB 8507: NORMAL
IMM GRANULOCYTES # BLD AUTO: 0.04 K/UL (ref 0–0.11)
IMM GRANULOCYTES NFR BLD AUTO: 0.5 % (ref 0–0.9)
LYMPHOCYTES # BLD AUTO: 2.44 K/UL (ref 1–4.8)
LYMPHOCYTES NFR BLD: 32.2 % (ref 22–41)
MCH RBC QN AUTO: 31 PG (ref 27–33)
MCHC RBC AUTO-ENTMCNC: 34.8 G/DL (ref 33.6–35)
MCV RBC AUTO: 89 FL (ref 81.4–97.8)
MONOCYTES # BLD AUTO: 0.46 K/UL (ref 0–0.85)
MONOCYTES NFR BLD AUTO: 6.1 % (ref 0–13.4)
NEUTROPHILS # BLD AUTO: 4.55 K/UL (ref 2–7.15)
NEUTROPHILS NFR BLD: 60 % (ref 44–72)
NRBC # BLD AUTO: 0 K/UL
NRBC BLD-RTO: 0 /100 WBC
PLATELET # BLD AUTO: 289 K/UL (ref 164–446)
PMV BLD AUTO: 9.7 FL (ref 9–12.9)
POTASSIUM SERPL-SCNC: 3.4 MMOL/L (ref 3.6–5.5)
PROT SERPL-MCNC: 6.9 G/DL (ref 6–8.2)
RBC # BLD AUTO: 4.2 M/UL (ref 4.2–5.4)
SODIUM SERPL-SCNC: 137 MMOL/L (ref 135–145)
URATE SERPL-MCNC: 4.1 MG/DL (ref 1.9–8.2)
WBC # BLD AUTO: 7.6 K/UL (ref 4.8–10.8)

## 2019-12-11 PROCEDURE — 770002 HCHG ROOM/CARE - OB PRIVATE (112)

## 2019-12-11 PROCEDURE — 85025 COMPLETE CBC W/AUTO DIFF WBC: CPT

## 2019-12-11 PROCEDURE — 80053 COMPREHEN METABOLIC PANEL: CPT

## 2019-12-11 PROCEDURE — 700102 HCHG RX REV CODE 250 W/ 637 OVERRIDE(OP): Performed by: OBSTETRICS & GYNECOLOGY

## 2019-12-11 PROCEDURE — A9270 NON-COVERED ITEM OR SERVICE: HCPCS | Performed by: OBSTETRICS & GYNECOLOGY

## 2019-12-11 PROCEDURE — 84550 ASSAY OF BLOOD/URIC ACID: CPT

## 2019-12-11 RX ORDER — SODIUM CHLORIDE, SODIUM LACTATE, POTASSIUM CHLORIDE, CALCIUM CHLORIDE 600; 310; 30; 20 MG/100ML; MG/100ML; MG/100ML; MG/100ML
INJECTION, SOLUTION INTRAVENOUS CONTINUOUS
Status: DISCONTINUED | OUTPATIENT
Start: 2019-12-11 | End: 2019-12-12 | Stop reason: HOSPADM

## 2019-12-11 RX ORDER — ONDANSETRON 2 MG/ML
4 INJECTION INTRAMUSCULAR; INTRAVENOUS EVERY 6 HOURS PRN
Status: DISCONTINUED | OUTPATIENT
Start: 2019-12-11 | End: 2019-12-13 | Stop reason: HOSPADM

## 2019-12-11 RX ORDER — DEXTROSE, SODIUM CHLORIDE, SODIUM LACTATE, POTASSIUM CHLORIDE, AND CALCIUM CHLORIDE 5; .6; .31; .03; .02 G/100ML; G/100ML; G/100ML; G/100ML; G/100ML
INJECTION, SOLUTION INTRAVENOUS CONTINUOUS
Status: DISCONTINUED | OUTPATIENT
Start: 2019-12-12 | End: 2019-12-13 | Stop reason: HOSPADM

## 2019-12-11 RX ORDER — ONDANSETRON 4 MG/1
4 TABLET, ORALLY DISINTEGRATING ORAL EVERY 6 HOURS PRN
Status: DISCONTINUED | OUTPATIENT
Start: 2019-12-11 | End: 2019-12-13 | Stop reason: HOSPADM

## 2019-12-11 RX ORDER — CITRIC ACID/SODIUM CITRATE 334-500MG
30 SOLUTION, ORAL ORAL EVERY 6 HOURS PRN
Status: DISCONTINUED | OUTPATIENT
Start: 2019-12-11 | End: 2019-12-12 | Stop reason: HOSPADM

## 2019-12-11 RX ORDER — TERBUTALINE SULFATE 1 MG/ML
0.25 INJECTION, SOLUTION SUBCUTANEOUS PRN
Status: DISCONTINUED | OUTPATIENT
Start: 2019-12-11 | End: 2019-12-12 | Stop reason: HOSPADM

## 2019-12-11 RX ORDER — ALUMINA, MAGNESIA, AND SIMETHICONE 2400; 2400; 240 MG/30ML; MG/30ML; MG/30ML
30 SUSPENSION ORAL EVERY 6 HOURS PRN
Status: DISCONTINUED | OUTPATIENT
Start: 2019-12-11 | End: 2019-12-12 | Stop reason: HOSPADM

## 2019-12-11 RX ADMIN — ALUMINUM HYDROXIDE, MAGNESIUM HYDROXIDE,SIMETHICONE 30 ML: 400; 400; 40 LIQUID ORAL at 22:03

## 2019-12-11 RX ADMIN — MISOPROSTOL 25 MCG: 100 TABLET ORAL at 21:10

## 2019-12-11 SDOH — ECONOMIC STABILITY: TRANSPORTATION INSECURITY
IN THE PAST 12 MONTHS, HAS THE LACK OF TRANSPORTATION KEPT YOU FROM MEDICAL APPOINTMENTS OR FROM GETTING MEDICATIONS?: PATIENT DECLINED

## 2019-12-11 SDOH — ECONOMIC STABILITY: FOOD INSECURITY: WITHIN THE PAST 12 MONTHS, THE FOOD YOU BOUGHT JUST DIDN'T LAST AND YOU DIDN'T HAVE MONEY TO GET MORE.: PATIENT DECLINED

## 2019-12-11 SDOH — ECONOMIC STABILITY: FOOD INSECURITY: WITHIN THE PAST 12 MONTHS, YOU WORRIED THAT YOUR FOOD WOULD RUN OUT BEFORE YOU GOT MONEY TO BUY MORE.: PATIENT DECLINED

## 2019-12-11 SDOH — ECONOMIC STABILITY: TRANSPORTATION INSECURITY
IN THE PAST 12 MONTHS, HAS LACK OF TRANSPORTATION KEPT YOU FROM MEETINGS, WORK, OR FROM GETTING THINGS NEEDED FOR DAILY LIVING?: PATIENT DECLINED

## 2019-12-11 ASSESSMENT — COPD QUESTIONNAIRES
DO YOU EVER COUGH UP ANY MUCUS OR PHLEGM?: NO/ONLY WITH OCCASIONAL COLDS OR INFECTIONS
COPD SCREENING SCORE: 0
IN THE PAST 12 MONTHS DO YOU DO LESS THAN YOU USED TO BECAUSE OF YOUR BREATHING PROBLEMS: DISAGREE/UNSURE
DURING THE PAST 4 WEEKS HOW MUCH DID YOU FEEL SHORT OF BREATH: NONE/LITTLE OF THE TIME
HAVE YOU SMOKED AT LEAST 100 CIGARETTES IN YOUR ENTIRE LIFE: NO/DON'T KNOW

## 2019-12-11 ASSESSMENT — LIFESTYLE VARIABLES
EVER_SMOKED: NEVER
EVER HAD A DRINK FIRST THING IN THE MORNING TO STEADY YOUR NERVES TO GET RID OF A HANGOVER: NO
CONSUMPTION TOTAL: NEGATIVE
EVER FELT BAD OR GUILTY ABOUT YOUR DRINKING: NO
AVERAGE NUMBER OF DAYS PER WEEK YOU HAVE A DRINK CONTAINING ALCOHOL: 0
HOW MANY TIMES IN THE PAST YEAR HAVE YOU HAD 5 OR MORE DRINKS IN A DAY: 0
ALCOHOL_USE: NO
TOTAL SCORE: 0
HAVE PEOPLE ANNOYED YOU BY CRITICIZING YOUR DRINKING: NO
ON A TYPICAL DAY WHEN YOU DRINK ALCOHOL HOW MANY DRINKS DO YOU HAVE: 0
HAVE YOU EVER FELT YOU SHOULD CUT DOWN ON YOUR DRINKING: NO
DOES PATIENT WANT TO STOP DRINKING: NO
TOTAL SCORE: 0
TOTAL SCORE: 0

## 2019-12-11 ASSESSMENT — PATIENT HEALTH QUESTIONNAIRE - PHQ9
SUM OF ALL RESPONSES TO PHQ9 QUESTIONS 1 AND 2: 0
2. FEELING DOWN, DEPRESSED, IRRITABLE, OR HOPELESS: NOT AT ALL
1. LITTLE INTEREST OR PLEASURE IN DOING THINGS: NOT AT ALL

## 2019-12-11 ASSESSMENT — PAIN SCALES - GENERAL: PAINLEVEL: 0 - NO PAIN

## 2019-12-12 ENCOUNTER — ANESTHESIA (OUTPATIENT)
Dept: ANESTHESIOLOGY | Facility: MEDICAL CENTER | Age: 34
End: 2019-12-12
Payer: COMMERCIAL

## 2019-12-12 ENCOUNTER — ANESTHESIA EVENT (OUTPATIENT)
Dept: ANESTHESIOLOGY | Facility: MEDICAL CENTER | Age: 34
End: 2019-12-12
Payer: COMMERCIAL

## 2019-12-12 VITALS
RESPIRATION RATE: 17 BRPM | TEMPERATURE: 98.3 F | SYSTOLIC BLOOD PRESSURE: 134 MMHG | HEART RATE: 92 BPM | WEIGHT: 258 LBS | OXYGEN SATURATION: 91 % | BODY MASS INDEX: 42.99 KG/M2 | HEIGHT: 65 IN | DIASTOLIC BLOOD PRESSURE: 79 MMHG

## 2019-12-12 LAB
ERYTHROCYTE [DISTWIDTH] IN BLOOD BY AUTOMATED COUNT: 40.3 FL (ref 35.9–50)
HCT VFR BLD AUTO: 33.7 % (ref 37–47)
HGB BLD-MCNC: 11.5 G/DL (ref 12–16)
MCH RBC QN AUTO: 30.6 PG (ref 27–33)
MCHC RBC AUTO-ENTMCNC: 34.1 G/DL (ref 33.6–35)
MCV RBC AUTO: 89.6 FL (ref 81.4–97.8)
PLATELET # BLD AUTO: 248 K/UL (ref 164–446)
PMV BLD AUTO: 9.3 FL (ref 9–12.9)
RBC # BLD AUTO: 3.76 M/UL (ref 4.2–5.4)
WBC # BLD AUTO: 14.1 K/UL (ref 4.8–10.8)

## 2019-12-12 PROCEDURE — 59409 OBSTETRICAL CARE: CPT

## 2019-12-12 PROCEDURE — 700102 HCHG RX REV CODE 250 W/ 637 OVERRIDE(OP): Performed by: OBSTETRICS & GYNECOLOGY

## 2019-12-12 PROCEDURE — 700105 HCHG RX REV CODE 258: Performed by: OBSTETRICS & GYNECOLOGY

## 2019-12-12 PROCEDURE — 3E0P7VZ INTRODUCTION OF HORMONE INTO FEMALE REPRODUCTIVE, VIA NATURAL OR ARTIFICIAL OPENING: ICD-10-PCS | Performed by: OBSTETRICS & GYNECOLOGY

## 2019-12-12 PROCEDURE — 303615 HCHG EPIDURAL/SPINAL ANESTHESIA FOR LABOR

## 2019-12-12 PROCEDURE — 770002 HCHG ROOM/CARE - OB PRIVATE (112)

## 2019-12-12 PROCEDURE — 700111 HCHG RX REV CODE 636 W/ 250 OVERRIDE (IP)

## 2019-12-12 PROCEDURE — 85027 COMPLETE CBC AUTOMATED: CPT

## 2019-12-12 PROCEDURE — A9270 NON-COVERED ITEM OR SERVICE: HCPCS | Performed by: OBSTETRICS & GYNECOLOGY

## 2019-12-12 PROCEDURE — 304965 HCHG RECOVERY SERVICES

## 2019-12-12 PROCEDURE — 700111 HCHG RX REV CODE 636 W/ 250 OVERRIDE (IP): Performed by: OBSTETRICS & GYNECOLOGY

## 2019-12-12 PROCEDURE — 36415 COLL VENOUS BLD VENIPUNCTURE: CPT

## 2019-12-12 PROCEDURE — 0KQM0ZZ REPAIR PERINEUM MUSCLE, OPEN APPROACH: ICD-10-PCS | Performed by: OBSTETRICS & GYNECOLOGY

## 2019-12-12 PROCEDURE — 700105 HCHG RX REV CODE 258

## 2019-12-12 PROCEDURE — 700111 HCHG RX REV CODE 636 W/ 250 OVERRIDE (IP): Performed by: ANESTHESIOLOGY

## 2019-12-12 RX ORDER — VITAMIN A ACETATE, BETA CAROTENE, ASCORBIC ACID, CHOLECALCIFEROL, .ALPHA.-TOCOPHEROL ACETATE, DL-, THIAMINE MONONITRATE, RIBOFLAVIN, NIACINAMIDE, PYRIDOXINE HYDROCHLORIDE, FOLIC ACID, CYANOCOBALAMIN, CALCIUM CARBONATE, FERROUS FUMARATE, ZINC OXIDE, CUPRIC OXIDE 3080; 12; 120; 400; 1; 1.84; 3; 20; 22; 920; 25; 200; 27; 10; 2 [IU]/1; UG/1; MG/1; [IU]/1; MG/1; MG/1; MG/1; MG/1; MG/1; [IU]/1; MG/1; MG/1; MG/1; MG/1; MG/1
1 TABLET, FILM COATED ORAL EVERY MORNING
Status: DISCONTINUED | OUTPATIENT
Start: 2019-12-12 | End: 2019-12-13 | Stop reason: HOSPADM

## 2019-12-12 RX ORDER — SODIUM CHLORIDE, SODIUM LACTATE, POTASSIUM CHLORIDE, CALCIUM CHLORIDE 600; 310; 30; 20 MG/100ML; MG/100ML; MG/100ML; MG/100ML
INJECTION, SOLUTION INTRAVENOUS PRN
Status: DISCONTINUED | OUTPATIENT
Start: 2019-12-12 | End: 2019-12-13 | Stop reason: HOSPADM

## 2019-12-12 RX ORDER — SODIUM CHLORIDE, SODIUM LACTATE, POTASSIUM CHLORIDE, CALCIUM CHLORIDE 600; 310; 30; 20 MG/100ML; MG/100ML; MG/100ML; MG/100ML
INJECTION, SOLUTION INTRAVENOUS
Status: COMPLETED
Start: 2019-12-12 | End: 2019-12-12

## 2019-12-12 RX ORDER — ROPIVACAINE HYDROCHLORIDE 2 MG/ML
INJECTION, SOLUTION EPIDURAL; INFILTRATION; PERINEURAL
Status: COMPLETED
Start: 2019-12-12 | End: 2019-12-12

## 2019-12-12 RX ORDER — HYDROCODONE BITARTRATE AND ACETAMINOPHEN 10; 325 MG/1; MG/1
1 TABLET ORAL EVERY 4 HOURS PRN
Status: DISCONTINUED | OUTPATIENT
Start: 2019-12-12 | End: 2019-12-13 | Stop reason: HOSPADM

## 2019-12-12 RX ORDER — MISOPROSTOL 200 UG/1
800 TABLET ORAL
Status: DISCONTINUED | OUTPATIENT
Start: 2019-12-12 | End: 2019-12-13 | Stop reason: HOSPADM

## 2019-12-12 RX ORDER — SODIUM CHLORIDE, SODIUM LACTATE, POTASSIUM CHLORIDE, CALCIUM CHLORIDE 600; 310; 30; 20 MG/100ML; MG/100ML; MG/100ML; MG/100ML
INJECTION, SOLUTION INTRAVENOUS CONTINUOUS
Status: DISCONTINUED | OUTPATIENT
Start: 2019-12-12 | End: 2019-12-13 | Stop reason: HOSPADM

## 2019-12-12 RX ORDER — MISOPROSTOL 200 UG/1
800 TABLET ORAL
Status: DISCONTINUED | OUTPATIENT
Start: 2019-12-12 | End: 2019-12-12 | Stop reason: HOSPADM

## 2019-12-12 RX ORDER — HYDROCODONE BITARTRATE AND ACETAMINOPHEN 5; 325 MG/1; MG/1
1 TABLET ORAL EVERY 4 HOURS PRN
Status: DISCONTINUED | OUTPATIENT
Start: 2019-12-12 | End: 2019-12-13 | Stop reason: HOSPADM

## 2019-12-12 RX ORDER — DOCUSATE SODIUM 100 MG/1
100 CAPSULE, LIQUID FILLED ORAL 2 TIMES DAILY PRN
Status: DISCONTINUED | OUTPATIENT
Start: 2019-12-12 | End: 2019-12-13 | Stop reason: HOSPADM

## 2019-12-12 RX ORDER — LIDOCAINE HYDROCHLORIDE 10 MG/ML
INJECTION, SOLUTION EPIDURAL; INFILTRATION; INTRACAUDAL; PERINEURAL PRN
Status: DISCONTINUED | OUTPATIENT
Start: 2019-12-12 | End: 2019-12-12 | Stop reason: SURG

## 2019-12-12 RX ORDER — SODIUM CHLORIDE, SODIUM LACTATE, POTASSIUM CHLORIDE, AND CALCIUM CHLORIDE .6; .31; .03; .02 G/100ML; G/100ML; G/100ML; G/100ML
250 INJECTION, SOLUTION INTRAVENOUS PRN
Status: DISCONTINUED | OUTPATIENT
Start: 2019-12-12 | End: 2019-12-12 | Stop reason: HOSPADM

## 2019-12-12 RX ORDER — ROPIVACAINE HYDROCHLORIDE 2 MG/ML
INJECTION, SOLUTION EPIDURAL; INFILTRATION; PERINEURAL CONTINUOUS
Status: DISCONTINUED | OUTPATIENT
Start: 2019-12-12 | End: 2019-12-12 | Stop reason: HOSPADM

## 2019-12-12 RX ORDER — ACETAMINOPHEN 325 MG/1
325 TABLET ORAL EVERY 4 HOURS PRN
Status: DISCONTINUED | OUTPATIENT
Start: 2019-12-12 | End: 2019-12-13 | Stop reason: HOSPADM

## 2019-12-12 RX ORDER — SODIUM CHLORIDE, SODIUM LACTATE, POTASSIUM CHLORIDE, AND CALCIUM CHLORIDE .6; .31; .03; .02 G/100ML; G/100ML; G/100ML; G/100ML
1000 INJECTION, SOLUTION INTRAVENOUS
Status: DISCONTINUED | OUTPATIENT
Start: 2019-12-12 | End: 2019-12-12 | Stop reason: HOSPADM

## 2019-12-12 RX ORDER — IBUPROFEN 600 MG/1
600 TABLET ORAL EVERY 6 HOURS PRN
Status: DISCONTINUED | OUTPATIENT
Start: 2019-12-12 | End: 2019-12-13 | Stop reason: HOSPADM

## 2019-12-12 RX ADMIN — VITAMIN A, VITAMIN C, VITAMIN D-3, VITAMIN E, VITAMIN B-1, VITAMIN B-2, NIACIN, VITAMIN B-6, CALCIUM, IRON, ZINC, COPPER 1 TABLET: 4000; 120; 400; 22; 1.84; 3; 20; 10; 1; 12; 200; 27; 25; 2 TABLET ORAL at 11:29

## 2019-12-12 RX ADMIN — SODIUM CHLORIDE, POTASSIUM CHLORIDE, SODIUM LACTATE AND CALCIUM CHLORIDE: 600; 310; 30; 20 INJECTION, SOLUTION INTRAVENOUS at 05:18

## 2019-12-12 RX ADMIN — ROPIVACAINE HYDROCHLORIDE 200 MG: 2 INJECTION, SOLUTION EPIDURAL; INFILTRATION; PERINEURAL at 05:54

## 2019-12-12 RX ADMIN — FENTANYL CITRATE 100 MCG: 0.05 INJECTION, SOLUTION INTRAMUSCULAR; INTRAVENOUS at 04:57

## 2019-12-12 RX ADMIN — HYDROCODONE BITARTRATE AND ACETAMINOPHEN 1 TABLET: 5; 325 TABLET ORAL at 15:28

## 2019-12-12 RX ADMIN — LIDOCAINE HYDROCHLORIDE 50 MG: 10 INJECTION, SOLUTION EPIDURAL; INFILTRATION; INTRACAUDAL; PERINEURAL at 06:30

## 2019-12-12 RX ADMIN — IBUPROFEN 600 MG: 600 TABLET ORAL at 09:49

## 2019-12-12 RX ADMIN — SODIUM CHLORIDE, SODIUM LACTATE, POTASSIUM CHLORIDE, CALCIUM CHLORIDE 1000 ML: 600; 310; 30; 20 INJECTION, SOLUTION INTRAVENOUS at 04:58

## 2019-12-12 RX ADMIN — SODIUM CHLORIDE, POTASSIUM CHLORIDE, SODIUM LACTATE AND CALCIUM CHLORIDE 1000 ML: 600; 310; 30; 20 INJECTION, SOLUTION INTRAVENOUS at 04:58

## 2019-12-12 RX ADMIN — OXYTOCIN 125 ML/HR: 10 INJECTION, SOLUTION INTRAMUSCULAR; INTRAVENOUS at 09:18

## 2019-12-12 RX ADMIN — ROPIVACAINE HYDROCHLORIDE 200 MG: 2 INJECTION, SOLUTION EPIDURAL; INFILTRATION at 05:54

## 2019-12-12 RX ADMIN — MISOPROSTOL 50 MCG: 100 TABLET ORAL at 01:46

## 2019-12-12 RX ADMIN — ACETAMINOPHEN 325 MG: 325 TABLET, FILM COATED ORAL at 15:28

## 2019-12-12 RX ADMIN — FENTANYL CITRATE 100 MCG: 0.05 INJECTION, SOLUTION INTRAMUSCULAR; INTRAVENOUS at 03:43

## 2019-12-12 RX ADMIN — OXYTOCIN 2000 ML/HR: 10 INJECTION, SOLUTION INTRAMUSCULAR; INTRAVENOUS at 08:41

## 2019-12-12 RX ADMIN — ACETAMINOPHEN 325 MG: 325 TABLET, FILM COATED ORAL at 11:29

## 2019-12-12 NOTE — PROGRESS NOTES
ADMIT H and P DICTATED    33 yo , KIANNA 2019, EGA 39 1/ wks  Cervical ripening/induction underway.  Indication: chronic HTN, well-controlled without meds (labetalol DCd 1st trimester and never resumed).    PNC:  A pos, R imm, GBS neg, cffDNA normal, U/S normal at 18 and 31 weeks, ASA 81 mg/day    PMH: MTHFR heterozygote, RPL    OBH:  1. 2017, no D and E  2. 2017, D and E  3. 2018, unsuccessful D and E attempt (cx stenosis),  mifeprex and misoprostol, tissue extracted on office 1 day later    PSH:   D and E 10/2017  Attempt D and E, attempt HS, dx cystoscopy 2018 (cx stenosis)    Med:  PNV, ASA 81 mg/day    Afeb,  /81    cx closed, posterior     2019 20:50   WBC 7.6   RBC 4.20   Hemoglobin 13.0   Hematocrit 37.4   MCV 89.0   MCH 31.0   MCHC 34.8   RDW 40.6   Platelet Count 289     DX:    39 1 wks  Chr HTN, well-controlled    PLAN:  Misoprostol 25 mcg placed at 21:00 by RN, re-evaluate in 4 hrs.  Deliver.

## 2019-12-12 NOTE — PROGRESS NOTES
Report received from Ana M JONES.   Recovery: fundus: firm/light   0955: pt up to bathroom, voided ( small amount), kirti care provided, gown changed.   1000: pt transferred to postartum via wheelchair in stable condition, report given to Lizy JONES. Bands checked

## 2019-12-12 NOTE — ANESTHESIA PREPROCEDURE EVALUATION
Relevant Problems   No relevant active problems       Physical Exam    Airway   Mallampati: II  TM distance: >3 FB  Neck ROM: full       Cardiovascular - normal exam  Rhythm: regular  Rate: normal  (-) murmur     Dental - normal exam         Pulmonary - normal exam  Breath sounds clear to auscultation     Abdominal   (+) obese    Comments: gravid   Neurological - normal exam                 Anesthesia Plan    ASA 2       Plan - epidural   Neuraxial block will be labor analgesia              Pertinent diagnostic labs and testing reviewed    Informed Consent:    Anesthetic plan and risks discussed with patient.

## 2019-12-12 NOTE — PROGRESS NOTES
FHR Cat I  CtxQ 3 minutes but pt appears comfortable.  cx soft, closed, 50%, -2  cx feels scarred, I was unable to penetrate cicatrix with my finger    PLAN:  I placed misoprostol dose #2, 50 mcg this time, posterior fornix.

## 2019-12-12 NOTE — PROGRESS NOTES
07-report received from TADEO Raphael RN, care assumed, POC discussed, pt rolled to R side, lates resolved  0710-reviewed tracing with Dr Gallardo  0716-Dr Gallardo in room, SVE complete/+1, SROM clear fluid  0726-started pushing  0736- viable male per Dr Gallardo  0741-placenta

## 2019-12-12 NOTE — PROGRESS NOTES
Bedside report received from Jose (MELVA RN); mother fundus firm with light rubra, denies pain at this time, just mild cramping; patient,  and family oriented to room including mother and infant safety and when to call nurse; infant in transition and was just placed skin to skin to promote breastfeeding; plan of care for shift discussed and questions answered

## 2019-12-12 NOTE — PROGRESS NOTES
Pt received to Rm 220 for scheduled IOL. Pt has CHTN not medicated.  EFM applied, VSS.  Orders received from Dr Gallardo.  Admission completed, labs and prenatals reviewed.  Pt educated on pain intervention options.  POC discussed with pt, Family at bedside  2110) SVE closed/50/-2.  Cytotec 25 mcg placed vaginally  0120) Pt feeling small amounts of leaking, no fluid visualized.  Pt up to bathroom  0130) Dr Bowser notified of pt status, uc's, and late decels.    0135) Dr Gallardo at bedside, strip reviewed, pt states uc's feeling like menstrual cramps at this time.  Sterile spec performed with no pooling noted.  SVE closed/50/-2.  Order received  0146) Dr Gallardo placed 50mcg cytotec vaginally.    0300) Pt breathing with uc's, discussed pain intervention options pt unsure at this time, family at bedside providing support.  assisted to bathroom  0345) Pt requesting IV pain medication.  Fentanyl given  0457) Fentanyl given for pain relief, Epidural bolus started  0540) Dr Yoder at bedside to place epidural  0615) Pt continuing to breath with uc's.  Right leg numb.  Santos placed  0625) Pt has no pain relief, epidural self bolus previously used.  Dr Yoder to bedside to assess, bolus given Late decels, pt wedged to left side  0635) Pt having late decels with uc's, VSS.  Dr Gallardo notifed and at side of bed to evaluate  FHT's and pt status.  SVE 9 cm.  Epidural self bolus used  0645) Report to FRANCO Vargas RN

## 2019-12-12 NOTE — ANESTHESIA PROCEDURE NOTES
Epidural Block  Date/Time: 12/12/2019 5:35 AM  Performed by: Mor Yoder M.D.  Authorized by: Mor Yoder M.D.     Patient Location:  OB  Start Time:  12/12/2019 5:35 AM  End Time:  12/12/2019 5:40 AM  Reason for Block: labor analgesia    patient identified, IV checked, site marked, risks and benefits discussed, surgical consent, monitors and equipment checked, pre-op evaluation and timeout performed    Patient Position:  Sitting  Prep: ChloraPrep, patient draped and sterile technique    Monitoring:  Blood pressure, continuous pulse oximetry and heart rate  Approach:  Midline  Location:  L4-L5  Injection Technique:  ZUHAIR saline  Skin infiltration:  Lidocaine  Strength:  1%  Dose:  3ml  Needle Type:  Tuohy  Needle Gauge:  17 G  Needle Length:  3.5 in  Loss of resistance::  8  Catheter Size:  19 G  Catheter at Skin Depth:  14  Test Dose:  Lidocaine 1.5% with epinephrine 1-to-200,000  Test Dose Result:  Negative

## 2019-12-12 NOTE — L&D DELIVERY NOTE
10-minute 2nd stage  Baby:  Male, APGARs 8-9, not weighed yet  Single loose nuchal cord  plac spont,  cc  No epis  2nd degree lac, 3-0 vicryl    PLAN:  PP care.  If SBP>150 and/or DBP>100, start labetalol 100 mg PO Q12H.

## 2019-12-12 NOTE — H&P
DATE OF ADMISSION:  2019    CHIEF COMPLAINT:  1.  A 39-1/7 week gestation.  2.  Chronic hypertension.    HISTORY OF PRESENT ILLNESS:  The patient is a 34-year-old lady,  4,   para 0 with an KIANNA of 2019, making her EGA 39-1/7 weeks.  I have   recommended induction and delivery because of a history of maternal chronic   hypertension.  Her blood pressure has been well controlled without medication.    She was on labetalol preconception.  Labetalol was discontinued in the first   trimester and never resumed.  She has been on prophylactic low-dose aspirin.    A third trimester fetal nonstress tests have been reassuring and ultrasounds   have revealed normal fetal growth and amniotic fluid volume.  Her cervix is   unfavorable, so we are administering misoprostol for cervical ripening   effects.    Prenatal care as above.  Her blood type is A positive.  She is immune to   rubella.  Her group B strep test was negative.  Cell free fetal DNA reassuring   was reassuring.  Ultrasound at 18 and 31 weeks revealed normal fetal anatomy   and growth, with posterior fundal placenta.  She was on aspirin 81 mg per day,   beginning at 16 weeks' gestation.  She did not receive any antihypertensives.    PAST MEDICAL HISTORY:  Positive for chronic hypertension, recurrent pregnancy   loss, and MTHFR heterozygosity discovered during her pregnancy loss workup.    She has no history of thrombosis.    OBSTETRIC HISTORY:  1.  First trimester spontaneous  in 2017, no D and E required.  2.  First trimester spontaneous  in 2017, D and E required.  3.  First trimester spontaneous  in 2018, my attempt at D and E and   hysteroscopy was unsuccessful secondary to cervical stenosis, diagnostic   cystoscopy normal, suspected uterine perforation, Mifeprex and misoprostol   administered and tissue was successfully extracted in the office 1 day later.    PAST SURGICAL HISTORY:  1.  D any E, 10/2017.  2.  Attempted D  and E, attempted hysteroscopy, diagnostic cystoscopy in 2018.    MEDICATIONS:  1.  Prenatal vitamin daily.  2.  Aspirin 81 mg per day.    PHYSICAL EXAMINATION:  VITAL SIGNS:  Afebrile, blood pressure 133/81.  HEENT:  Normal.  LUNGS:  Clear to auscultation.  HEART:  Sounds normal.  ABDOMEN:  Nontender.  No HSM.  Fundal height is appropriate.  Uterus   nontender.  PELVIC:  Cervix closed, approximately 30% effaced, posterior, soft.  Baby   presents cephalically at -2 station.  EXTREMITIES:  Trace edema.  Homans negative.  DTRs normal.    LABORATORY DATA:  Hemoglobin 13.0, hematocrit 37.4, white blood count 7600,   platelet count 289,000.    DIAGNOSES:  1.  A 39-1/7 week gestation.  2.  Chronic hypertension, well controlled.  3.  History of cervical stenosis, cervix tightly closed at this time.    PLAN:  Cervical ripening with misoprostol, dose #1 already placed, reevaluate   in 4 hours.  Pitocin if necessary, analgesia or epidural as needed, deliver.    So far, her blood pressure has not been an issue, we will use   antihypertensives if needed.       ____________________________________     MD ANIRUDH GALINDO / SHAMA    DD:  12/11/2019 22:55:31  DT:  12/12/2019 02:11:20    D#:  [id number]  Job#:  [id number]

## 2019-12-12 NOTE — L&D DELIVERY NOTE
DATE OF SERVICE:  2019    PROCEDURES:  1.  Induction of labor.  2.  Epidural anesthesia.  3.  Spontaneous vaginal delivery.  4.  Repair of second-degree perineal laceration.    OBSTETRICIAN:  Juan Gallardo MD    DIAGNOSES:  1.  A 39-2/7th week gestation, delivered.  2.  Induced labor.  3.  Chronic hypertension.  4.  Single nuchal cord.    COMPLICATIONS:  None.    ESTIMATED BLOOD LOSS:  200 mL    BABY:  Male, one-minute Apgar 8, five-minute Apgar 9.  Weight;  3525 grams    This 34-year-old lady is now .  She was admitted yesterday for induction   because of a history of chronic hypertension.  She actually remained   normotensive throughout pregnancy without antihypertensives, and did take   aspirin prophylactically.  Her antepartum group B strep culture was negative.    Her labor was successfully induced with 2 doses of intravaginal misoprostol.    She progressed very rapidly and second stage only lasted 10 minutes.  She   pushed the baby out occiput anterior in a controlled fashion with no   episiotomy.  I bulb suctioned the baby's mouth and nares.  I reduced a single   loose nuchal cord.  The shoulders and body delivered easily.  I placed the   baby on her chest and 3 minutes later, I doubly clamped and cut the cord.  The   placenta and all attached membranes delivered spontaneously.  There was a   3-vessel cord with marginal insertion.  I repaired a second-degree perineal   laceration with layered running 3-0 Vicryl suture.  Sponge and needle counts   were correct.    The patient did not require any antihypertensives intrapartum.       ____________________________________     MD AG GALINDOF / NTS    DD:  2019 08:05:48  DT:  2019 11:28:20    D#:  [id number]  Job#:  [id number]

## 2019-12-13 PROCEDURE — A9270 NON-COVERED ITEM OR SERVICE: HCPCS | Performed by: OBSTETRICS & GYNECOLOGY

## 2019-12-13 PROCEDURE — A9270 NON-COVERED ITEM OR SERVICE: HCPCS

## 2019-12-13 PROCEDURE — 700102 HCHG RX REV CODE 250 W/ 637 OVERRIDE(OP)

## 2019-12-13 PROCEDURE — 700102 HCHG RX REV CODE 250 W/ 637 OVERRIDE(OP): Performed by: OBSTETRICS & GYNECOLOGY

## 2019-12-13 NOTE — CARE PLAN
Problem: Communication  Goal: The ability to communicate needs accurately and effectively will improve  Outcome: PROGRESSING AS EXPECTED  Note:   Pt uses call light and communicates needs     Problem: Safety  Goal: Will remain free from injury  Outcome: PROGRESSING AS EXPECTED  Note:   Infant and mother oriented to room, unit, and safety on admission to [facility]

## 2019-12-14 NOTE — PROGRESS NOTES
1000 Discharge instructions reviewed, verbalized understanding, papers signed. Prescribed med scripts given, reviewed, verbalized understanding.

## 2019-12-15 ASSESSMENT — PAIN SCALES - GENERAL: PAIN_LEVEL: 2

## 2019-12-15 NOTE — ANESTHESIA QCDR
2019 [facility] Clinical Data Registry (for Quality Improvement)     Postoperative nausea/vomiting risk protocol (Adult = 18 yrs and Pediatric 3-17 yrs)- (430 and 463)  General inhalation anesthetic (NOT TIVA) with PONV risk factors: No  Provision of anti-emetic therapy with at least 2 different classes of agents: N/A  Patient DID NOT receive anti-emetic therapy and reason is documented in Medical Record: N/A    Multimodal Pain Management- (AQI59)  Patient undergoing Elective Surgery (i.e. Outpatient, or ASC, or Prescheduled Surgery prior to Hospital Admission): No  Use of Multimodal Pain Management, two or more drugs and/or interventions, NOT including systemic opioids: N/A  Exception: Documented allergy to multiple classes of analgesics: N/A    PACU assessment of acute postoperative pain prior to Anesthesia Care End- Applies to Patients Age = 18- (ABG7)  Initial PACU pain score is which of the following: < 7/10  Patient unable to report pain score: N/A    Post-anesthetic transfer of care checklist/protocol to PACU/ICU- (426 and 427)  Upon conclusion of case, patient transferred to which of the following locations: PACU/Non-ICU  Use of transfer checklist/protocol: Yes  Exclusion: Service Performed in Patient Hospital Room (and thus did not require transfer): N/A    PACU Reintubation- (AQI31)  General anesthesia requiring endotracheal intubation (ETT) along with subsequent extubation in OR or PACU: No  Required reintubation in the PACU: N/A  Extubation was a planned trial documented in the medical record prior to removal of the original airway device: N/A    Unplanned admission to ICU related to anesthesia service up through end of PACU care- (MD51)  Unplanned admission to ICU (not initially anticipated at anesthesia start time): No

## 2019-12-15 NOTE — ANESTHESIA POSTPROCEDURE EVALUATION
Patient: Ericka Flores    Procedure Summary     Date:  12/12/19 Room / Location:      Anesthesia Start:  0531 Anesthesia Stop:  0736    Procedure:  Labor Epidural Diagnosis:      Scheduled Providers:   Responsible Provider:  Jad Landers M.D.    Anesthesia Type:  epidural ASA Status:  2          Final Anesthesia Type: epidural  Last vitals   Please see epic for vital signs  Anesthesia Post Evaluation    Patient location during evaluation: floor  Patient participation: complete - patient participated  Level of consciousness: awake and alert  Pain score: 2    Airway patency: patent  Anesthetic complications: no  Cardiovascular status: hemodynamically stable  Respiratory status: acceptable  Hydration status: euvolemic    PONV: none    patient able to participate, but full recovery from regional anesthesia has not occurred and is not expected within the stipulated timeframe for the completion of the evaluation       Nurse Pain Score: 4 (NPRS)

## 2019-12-15 NOTE — ANESTHESIA TIME REPORT
Anesthesia Start and Stop Event Times     Date Time Event    12/12/2019 0530 Ready for Procedure     0531 Anesthesia Start     0736 Anesthesia Stop        Responsible Staff  12/12/19    Name Role Begin End    Mor Yoder M.D. Anesth 0531 0702    Jad Landers M.D. Anesth 0702 0736        Preop Diagnosis (Free Text):  Pre-op Diagnosis             Preop Diagnosis (Codes):    Post op Diagnosis  Pregnant      Premium Reason  A. 3PM - 7AM    Comments:

## 2019-12-30 ENCOUNTER — DOCUMENTATION (OUTPATIENT)
Dept: OBGYN | Facility: CLINIC | Age: 34
End: 2019-12-30

## 2019-12-30 NOTE — PROGRESS NOTES
Subjective:     HPI:   Ericka Flores is a 34 y.o. female here to establish care and to discuss lactation. She is here today with  (Anjum) and baby, Pillip.    Concerns:   Latch on difficulties , feeling that there is not enough milk  and slow weight gain   HPI:   Pertinent  history: , induced at 39 weeks, due to history of hypertension. Blood pressure was fine during pregnancy, done as a precaution.   Mother does not have a history of GDM, insulin resistance, multiple gestation, PCOS and thyroid disease. These are common conditions which may interfere in establishing milk supply.  Mother does have advanced maternal age and hypertension prior to pregnancy. These are common conditions which may interfere in establishing milk supply.  Breast changes in pregnancy: Yes, slightly bigger   History of breast surgeries: No      FEEDING HISTORY:    Past breastfeeding history: First baby   Hospital course: Exclusively   Currently: Breastfeeding every 2-4 hours, right side only, supplementing 2-3oz formula and breastmilk  Both breasts: Yes, mostly right side but occasionally offering the left  Bottle feeds: 8/24h    Supplement: Expressed breast milk and Formula, recommended by lactation at [facility] on Tuesday, 2019.  Quanity: 2-3oz   How given/devices:  Bottle    Nipple Shield Use: 20 mm and 24 mm  Recommended by: Mother recommended, started with 20mm and has attempted to use the 24mm with some difficulty  When started? Around 1 week of age    Breast Pumping:   Frequency: 3-4x every 24 hours  Type of pump: HGP  Flange size/type: 25mm  NO pain with pumping    ROS:  Constitutional: Well nourished, no acute distress, no fever, chills. Feeling well  Extremities Swelling: No extremity swelling  Gastrointestinal: Negative for nausea, vomiting, constipation  Breasts: No soreness of breasts and No soreness of nipples  Psychiatric: Experiencing anxiety and Managing ok. History of anxiety  but has not needed medication in the past. Referral to [facility] made preventatively.        Objective:     General: no acute distress  Neurological:  Alert and oriented x3  Breasts: Asymmetric , Soft, Plugged Duct - no evidence and Mastitis  - no S/S, right breast significantly larger than the left, both very elastic.   Nipples: intact  Psychiatric: Normal mood and affect. Her behavior is normal. Judgment and thought content normal       Assessment/Plan & Lactation Counseling:     Infant Weight History:   2019: 7# 12.3oz (BW)  2019: 7# 0.8oz (BFC)  2019: 7# 6.7oz  Infant intake at Breast:: L 20mls     R 28mls    Total 48mls  Pumped: Type of Pump: Hospital grade    Quantity Pumped:    Total 10mls  Initiation of Feeding: Infant initiates  Position of Feeding:    Right: cross cradle  Left: cross cradle  Attachment Achieved: with difficulty until assistance provided  Nipple shield: N/A      Suck Pattern at the breast: Suck burst and normal rest  Suck Pattern on the bottle: Suck burst and normal rest  Behavior Following Observed Feeding: content    Latch: Assisted latch, turned baby towards mom, proper hand placement shown, latched well  Suckling/Feeding: attaches, audible swallows, baby fed effectively, baby roots, elicits EDY, intermittent swallows and rhythmic  Milk Transfer at this feedinmls  TOTAL   Effective breastfeeding for milk available  Milk Supply Available: low, increasing from 2019  Low milk supply:   Likely due to: maternal medical history and ineffective or infrequent breast stimulation or milk removal    Discussed concerns and symptoms as listed above in assessment and guidance summarized below.  Topics reviewed included:  • Herbs and medications for increasing supply and their potential side effects and efficacy. No evidence base exists to support their use  • Feeding: Feed your baby every 2-3 hours   o Offer the breast, right side up to 15 minutes, left side 5-10  minutes  o Follow the breast with 2oz pumped milk/formula   • Pumping guidelines:  o 8x every 24 hours   o Always after feeding or in place of feeding   o Pump during the night without breastfeeding first  • Type of pump:  - Hospital grade  - [email or web address]/healthcare-professionals/videos/rashawn-platinum-[email or web address]-hygienikit-video  - Always double pump  o How long will vary woman to woman, typically 8-15 minutes, or 1-2 minutes after flow slows  o Flange Fit: Freely moving nipple in the tunnel with some movement of the areola.  - Today's evaluation indicates appropriate flange     Mom expressed understanding, and asked appropriate questions    Follow-up for infant weight check and dyad breastfeeding evaluation in 7-10 day(s)    A total of 85 minutes, this does not include infant assessment time, were spent face to face with more than 50% spent counseling and coordinating care as detailed in the above note.    Magdalena Horta

## 2020-01-02 DIAGNOSIS — F41.8 POSTPARTUM ANXIETY: ICD-10-CM

## 2020-01-02 NOTE — PROGRESS NOTES
Mom has had a difficulty time post partum and breastfeeding has been part of that. She would like to talk with someone about her concerns.   Referral placed to [facility]

## 2020-01-21 ENCOUNTER — HOSPITAL ENCOUNTER (OUTPATIENT)
Dept: LAB | Facility: MEDICAL CENTER | Age: 35
End: 2020-01-21
Attending: OBSTETRICS & GYNECOLOGY
Payer: COMMERCIAL

## 2020-01-21 LAB — CYTOLOGY REG CYTOL: NORMAL

## 2020-01-21 PROCEDURE — 88175 CYTOPATH C/V AUTO FLUID REDO: CPT

## 2020-02-28 ENCOUNTER — OFFICE VISIT (OUTPATIENT)
Dept: OBGYN | Facility: CLINIC | Age: 35
End: 2020-02-28
Payer: COMMERCIAL

## 2020-02-28 DIAGNOSIS — O92.5 LACTATION SUPPRESSION: ICD-10-CM

## 2020-02-28 PROCEDURE — 99205 OFFICE O/P NEW HI 60 MIN: CPT | Performed by: NURSE PRACTITIONER

## 2020-02-28 RX ORDER — METOCLOPRAMIDE 10 MG/1
10 TABLET ORAL 3 TIMES DAILY
Qty: 42 TAB | Refills: 0 | Status: SHIPPED | OUTPATIENT
Start: 2020-02-28 | End: 2020-03-13

## 2020-02-28 NOTE — PROGRESS NOTES
Subjective:       Ericka Flores is a 34 y.o. female here to establish lactation care. She is here today with baby.    Concerns:   feeling that there is not enough milk    Managing pumping    HPI:   Pertinent  history:  induced at 39 weeks due to chronic hypertension.  Blood pressure was very stable during pregnancy in fact off her medications    Mother does not have a history of GDM, insulin resistance, multiple gestation, PCOS and thyroid disease. These are common conditions which may interfere in establishing milk supply.    Mother does have advanced maternal age and hypertension prior to pregnancy. These are common conditions which may interfere in establishing milk supply.    Breast changes in pregnancy: Yes but minimally  History of breast surgeries: No      FEEDING HISTORY:    Past breastfeeding history:    first baby, mom had thought baby was doing well and surprised at 2-week visit that the baby was down by 12 ounces.  Did not hesitate to supplement and has infant growing beautifully at this point.  Started pumping breast-feeding and bottles  Currently: Mom is back to work and continues with offering the breast pumping after for the feedings and topping off with 3 to 4 ounces.  Mom has been trying to get into outpatient lactation but has had continual difficulty with her insurance for being seen.  She has finally gotten approval  Both breasts: Yes right breast has significantly more milk than the left breast almost 80/20 or 90/10  Bottle feeds: 8x/24h    Supplement: Expressed breast milk and Formula  Quanity: 3-4oz  How given/devices:  Bottle    Nipple Shield Use: None    Breast Pumping:   Frequency: 4 times per day  Type of pump: Ameda platinum or Medela pump and style  Flange size/type: 24 and 25 mm  NO pain with pumping    ROS:  Constitutional:   no fever, chills. Feeling well  Extremities Swelling: No extremity swelling  Gastrointestinal:  Negative for nausea, vomiting   has started on  birth control the Andree, had a full period 2 weeks ago having another full.  Now  Breasts: No soreness of breasts and No soreness of nipples  Psychiatric: No mood changes, Experiencing anxiety and Managing ok  Mental Health:  NOT Feeling overly irritable, agitated, angry, overwhelmed, apathy, exhaustion  NOT having sleep changes, appetite changes.  Mom is back to work can bring her baby and in a very supportive environment       Objective:     General: no acute distress  Neurological:  Alert and oriented x3  Breasts: Symetrical , Soft, Plugged Duct - no evidence and Mastitis  - no S/S  Nipples: intact  Psychiatric:  Normal mood and affect. Her behavior is normal. Judgment and thought content normal   Mental Health:  Sadness,  feeling overwhelmed, agitation, hypervigilance NOT exhibited.  Appropriate crying given the frustration with this breast-feeding situation   Assessment/Plan & Lactation Counseling:     Infant Weight History:   12/12 7#12.3oz  12/24 7#0.8oz  12/30 7#6.7oz  2/28/20 11#15.7oz 20%ile on the WHO chart, 35%ile on the CDC chart    Infant intake at Breast:: L 0.1oz     R 1.7oz    Total 1.8oz  Milk Transfer at this feeding:   Effective breastfeeding  Pumped: Type of Pump: Hospital grade    Quantity Pumped:  Total 35ml Mom had pumped for 10 minutes and then had another milk injection response which did provide half of the milk that she ended with at 15 minutes.  So she does have a delayed milk ejection response.  Initiation of Feeding: Infant initiates  Position of Feeding:    Right: cross cradle  Left: cross cradle  Attachment Achieved: rapidly baby works very hard to stimulate the letdown to wiggling and movements and once he gets a release he settles in and nurses beautifully  Nipple shield: N/A      Suck Pattern at the breast: Suck burst and normal rest  Suck Pattern on the bottle: Suck burst and normal rest  Behavior Following Observed Feeding: content  Nipple Pain from: none     Latch: Mom  latches independently  Suckling/Feeding: attaches, baby fed effectively, baby roots, frustrated, loses suction and rhythmic  Milk Supply Available: low  Low milk supply:   Likely due to: maternal medical history    Diagnosis/Problem  Lactation Suppression  Delayed EDY      Discussed concerns and symptoms as listed above in assessment and guidance summarized below.  Topics reviewed included:  •  Herbs and medications for increasing supply and their potential side effects and efficacy. No evidence base exists to support their use  •  Triple feeding and its sustainability and its impact on the mother baby relationship  •  Maternal Mental Health: Discussed new mothering in encountering terror with the task to keep her baby alive, the alarming realness  the simon of the heart and soul with the struggle to survive and there is revovery, but it is a journey and process.    •  Milk supply mom understands milk supply works on the principal of supplying demand and has been faithful to stimulating the breast with either the baby and/or the pump routinely.  At this point her milk supplies maximized.  We discussed the use of Reglan and domperidone to increase milk supply if the prolactin is a problem.  Will start on Reglan this weekend  Reglan Insufficient milk production is among the most cited reasons by mothers for discontinuing breastfeeding.Medications that can increase milk production, specifically reglan and domperidone, are off-label galactagogues re often prescribed.  A  2019 meta-analysis (2019 Adeola et al. [facility]) reports a significant improvement in expressed human milk volume per day with the use of domperidone in mothers experiencing insufficient human milk production.  Reglan is approved only for gastrokinetic purposes in the [place]. Domperidone is controversial as it is not approved for any purpose in the [place] and is approved only for gastrokinetic purposes in [place] and other countries.  Reglan:  10mg every 8 hours for 14 days. This medication is not being used for heartburn so it is not taken ½ hour before meals, but rather a level is sought to be maintained for 24 hours.  Milk supply increases should be experienced by day 5.  If no changes in milk supply after 5-7 days, it is not effective and should be discontinued.  It does NOT need to be weaned.  This medication is used as a trial to evaluate if lack of prolactin is the underlying cause of poor milk supply.  If it proves to increase supply, domperidone will be discussed.   Reported side effects of reglan were discussed, specifically: increased incidence of anxiety or depression in the first week of use and if used long term may increase the incidence of Tardive Dyskinesia, a potentially irreversible symptom when used in the elderly and long term.     Feeding: Continue with her feeding plan.  Discussed reducing pumping's again as long as she is providing the number stimulations per day that her body needs to maximize her milk production   Mom expressed understanding, and asked appropriate questions    Summary: Mom had a bad start to breast-feeding although she was doing all the things she was instructed including going to group and found at the pediatrician's office that her baby was not gaining weight.  She is subsequently pumped following breast-feeding and supplemented.  Growing her baby beautifully.  We will continue at this point to work on maximizing milk production to the degree that her body will cooperate.      Follow-up for infant weight check and dyad breastfeeding evaluation in 1 week(s)  Please call [phone] if you have not scheduled your next appointment    A total of 60 minutes, this does not include infant assessment time, were spent face to face with more than 50% spent counseling and coordinating care as detailed in the above note.    LANCE Price.

## 2020-03-06 ENCOUNTER — OFFICE VISIT (OUTPATIENT)
Dept: OBGYN | Facility: CLINIC | Age: 35
End: 2020-03-06
Payer: COMMERCIAL

## 2020-03-06 DIAGNOSIS — O92.5 LACTATION SUPPRESSION: ICD-10-CM

## 2020-03-06 PROCEDURE — 99215 OFFICE O/P EST HI 40 MIN: CPT | Performed by: NURSE PRACTITIONER

## 2020-03-06 NOTE — PROGRESS NOTES
Subjective:       Ericka Flores is a 34 y.o. female here to follow up lactation care. She is here today with baby.    Concerns:   Follow up on reglan  Management  Weight gain    HPI:   Pertinent  history:  induced at 39 weeks due to chronic hypertension.  Blood pressure was very stable during pregnancy in fact off her medications    Mother does not have a history of GDM, insulin resistance, multiple gestation, PCOS and thyroid disease. These are common conditions which may interfere in establishing milk supply.    Mother does have advanced maternal age and hypertension prior to pregnancy. These are common conditions which may interfere in establishing milk supply.    Breast changes in pregnancy: Yes but minimally  History of breast surgeries: No      FEEDING HISTORY:    Past breastfeeding history:    first baby, mom had thought baby was doing well and surprised at 2-week visit that the baby was down by 12 ounces.  Did not hesitate to supplement and has infant growing beautifully at this point.  Started pumping breast-feeding and bottles  Prior to 20 visit: Mom is back to work and continues with offering the breast pumping after for the feedings and topping off with 3 to 4 ounces.  Mom has been trying to get into outpatient lactation but has had continual difficulty with her insurance for being seen.  She has finally gotten approval  Currently mom is used medication Reglan and has seen an increase but nothing overwhelming.  She continues with a healthy breast-feeding relationship balancing it with formula.  Baby sleeping 6 hours at night.  Looking at weight today also  Both breasts: Yes right breast has significantly more milk than the left breast almost 80/20 or 90/10  Bottle feeds: 8x/24h    Supplement: Expressed breast milk and Formula  Quanity: 3-4oz  How given/devices:  Bottle    Nipple Shield Use: None    Breast Pumping:   Frequency: 4 times per day  Type of pump: Ameda platinum or Medela  pump and style  Flange size/type: 24 and 25 mm  NO pain with pumping    ROS:  Constitutional:   no fever, chills. Feeling well  Gastrointestinal:  Negative for nausea, vomiting  Breasts: No soreness of breasts and No soreness of nipples  Psychiatric: No mood changes, Experiencing anxiety and Managing ok  Mental Health:  NOT Feeling overly irritable, agitated, angry, overwhelmed, apathy, exhaustion  NOT having sleep changes, appetite changes.  Mom is back to work can bring her baby and in a very supportive environment.  Has resources for therapist and will reach out as needed       Objective:     General: no acute distress  Neurological:  Alert and oriented x3  Breasts: Symetrical , Soft, Plugged Duct - no evidence and Mastitis  - no S/S  Nipples: intact  Psychiatric:  Normal mood and affect. Her behavior is normal. Judgment and thought content normal   Mental Health:  Sadness,  feeling overwhelmed, agitation, hypervigilance NOT exhibited.  Appropriate crying given the frustration with this breast-feeding situation   Assessment/Plan & Lactation Counseling:     Infant Weight History:   12/12 7#12.3oz  12/24 7#0.8oz  12/30 7#6.7oz  2/28/20 11#15.7oz 20%ile on the WHO chart, 35%ile on the CDC chart  3/6/20 12#6.7oz  Decreased slightly on the WHO chart but measured length and increased, growing 4inches since birth    Infant intake at Breast:: L 0.5    R 1.6oz   Total 2.1ozoz  Milk Transfer at this feeding:   Effective breastfeeding  Pumped: Type of Pump: Hospital grade    Quantity Pumped:  Total Less than 5ml Mom had pumped for 14 minutes and did not have an additional milk ejection reflux.  She did do acupuncture this past week.  Infant actually emptied the breast effectively.    Initiation of Feeding: Infant initiates  Position of Feeding:    Right: cross cradle  Left: cross cradle  Attachment Achieved: rapidly baby works very hard to stimulate the letdown to wiggling and movements and once he gets a release he settles  in and nurses beautifully  Nipple shield: N/A      Suck Pattern at the breast: Suck burst and normal rest  Suck Pattern on the bottle: Suck burst and normal rest  Behavior Following Observed Feeding: content  Nipple Pain from: none     Latch: Mom latches independently  Suckling/Feeding: attaches, baby fed effectively, baby roots, frustrated, loses suction and rhythmic  Milk Supply Available: low  Low milk supply:   Likely due to: maternal medical history    Diagnosis/Problem  Lactation Suppression        Discussed concerns and symptoms as listed above in assessment and guidance summarized below.  Topics reviewed included:  •  Herbs and medications for increasing supply and their potential side effects and efficacy. No evidence base exists to support their use  •   Maternal Mental Health: Mom is aware of her own anxiety and has resources to support her as needed   Milk supply mom understands milk supply works on the principal of supplying demand and has been faithful to stimulating the breast with either the baby and/or the pump routinely.  At this point her milk supplies maximized.    Reglan for insufficient milk supply/lactation suppression.  Milk supply has increased but not dramatically, will complete the medication.  Will not continue to towards domperidone.  Discussed if over the course of breast-feeding supply has dipped and she needs another boost we will evaluate the use of Reglan again  Feeding: Continue with her feeding plan. Mom reducing pumping's again as long as she is providing the number stimulations per day that her body needs to maximize her milk production.  With pumping this morning after a feeding it was demonstrated that the baby empties incredibly effectively.     Mom expressed understanding, and asked appropriate questions    Summary: Ericka has maximized her milk supply and has taught her baby to breast-feed very well.  Balances breast and bottle beautifully.  Did discuss tummy time today and  provided a handout    Follow-up for infant weight check and dyad breastfeeding evaluation as needed    A total of 56 minutes, this does not include infant assessment time, were spent face to face with more than 50% spent counseling and coordinating care as detailed in the above note.    LANCE Price.

## 2020-09-17 ENCOUNTER — NURSE TRIAGE (OUTPATIENT)
Dept: HEALTH INFORMATION MANAGEMENT | Facility: OTHER | Age: 35
End: 2020-09-17

## 2020-09-17 NOTE — TELEPHONE ENCOUNTER
1. Caller Name: Ericka Flores                Call Back Number: [phone]  [facility] PCP or Specialty Provider: No          2.  In the last two weeks, has the patient had any new or worsening symptoms (not explained by alternative diagnosis)? Yes, the patient reports the following COVID-19 consistent symptoms: sore throat and congestion or runny nose, ear pain, and HA,  this am.    3.  Does patient have any comoribidities? HTN    4.  Has the patient traveled in the last 14 days OR had any known contact with someone who is suspected or confirmed to have COVID-19?  No.    5. Disposition: Advised to perform self care, monitor for worsening symptoms and to call back in 3 days if no improvement    Note routed to [facility] Provider: [facility] only.

## 2022-01-27 ENCOUNTER — HOSPITAL ENCOUNTER (OUTPATIENT)
Facility: MEDICAL CENTER | Age: 37
End: 2022-01-27
Attending: PHYSICIAN ASSISTANT
Payer: COMMERCIAL

## 2022-01-27 PROCEDURE — 87624 HPV HI-RISK TYP POOLED RSLT: CPT

## 2022-01-27 PROCEDURE — 88175 CYTOPATH C/V AUTO FLUID REDO: CPT

## 2022-01-30 LAB
CYTOLOGY REG CYTOL: NORMAL
HPV HR 12 DNA CVX QL NAA+PROBE: NEGATIVE
HPV16 DNA SPEC QL NAA+PROBE: NEGATIVE
HPV18 DNA SPEC QL NAA+PROBE: NEGATIVE
SPECIMEN SOURCE: NORMAL

## 2023-01-30 ENCOUNTER — HOSPITAL ENCOUNTER (OUTPATIENT)
Facility: MEDICAL CENTER | Age: 38
End: 2023-01-30
Payer: COMMERCIAL

## 2023-01-30 ENCOUNTER — HOSPITAL ENCOUNTER (OUTPATIENT)
Dept: LAB | Facility: MEDICAL CENTER | Age: 38
End: 2023-01-30

## 2023-01-30 PROCEDURE — 88175 CYTOPATH C/V AUTO FLUID REDO: CPT

## 2023-01-31 LAB — CYTOLOGY REG CYTOL: NORMAL

## 2023-08-24 ENCOUNTER — NON-PROVIDER VISIT (OUTPATIENT)
Dept: URGENT CARE | Facility: CLINIC | Age: 38
End: 2023-08-24

## 2023-08-24 DIAGNOSIS — Z11.1 VISIT FOR TB SKIN TEST: ICD-10-CM

## 2023-08-24 PROCEDURE — 86580 TB INTRADERMAL TEST: CPT | Performed by: PHYSICIAN ASSISTANT

## 2023-08-26 ENCOUNTER — NON-PROVIDER VISIT (OUTPATIENT)
Dept: URGENT CARE | Facility: CLINIC | Age: 38
End: 2023-08-26

## 2023-08-26 LAB — TB WHEAL 3D P 5 TU DIAM: NORMAL MM

## 2023-08-26 PROCEDURE — 99999 PR NO CHARGE: CPT | Performed by: PHYSICIAN ASSISTANT

## 2024-02-26 ENCOUNTER — HOSPITAL ENCOUNTER (OUTPATIENT)
Dept: LAB | Facility: MEDICAL CENTER | Age: 39
End: 2024-02-26
Attending: FAMILY MEDICINE
Payer: COMMERCIAL

## 2024-02-26 LAB
ALBUMIN SERPL BCP-MCNC: 4.1 G/DL (ref 3.2–4.9)
ALBUMIN/GLOB SERPL: 1.2 G/DL
ALP SERPL-CCNC: 65 U/L (ref 30–99)
ALT SERPL-CCNC: 25 U/L (ref 2–50)
ANION GAP SERPL CALC-SCNC: 13 MMOL/L (ref 7–16)
AST SERPL-CCNC: 26 U/L (ref 12–45)
BASOPHILS # BLD AUTO: 1 % (ref 0–1.8)
BASOPHILS # BLD: 0.07 K/UL (ref 0–0.12)
BILIRUB SERPL-MCNC: 0.3 MG/DL (ref 0.1–1.5)
BUN SERPL-MCNC: 10 MG/DL (ref 8–22)
CALCIUM ALBUM COR SERPL-MCNC: 9.2 MG/DL (ref 8.5–10.5)
CALCIUM SERPL-MCNC: 9.3 MG/DL (ref 8.5–10.5)
CHLORIDE SERPL-SCNC: 105 MMOL/L (ref 96–112)
CHOLEST SERPL-MCNC: 224 MG/DL (ref 100–199)
CO2 SERPL-SCNC: 23 MMOL/L (ref 20–33)
CREAT SERPL-MCNC: 0.73 MG/DL (ref 0.5–1.4)
EOSINOPHIL # BLD AUTO: 0.32 K/UL (ref 0–0.51)
EOSINOPHIL NFR BLD: 4.7 % (ref 0–6.9)
ERYTHROCYTE [DISTWIDTH] IN BLOOD BY AUTOMATED COUNT: 41.6 FL (ref 35.9–50)
EST. AVERAGE GLUCOSE BLD GHB EST-MCNC: 91 MG/DL
FASTING STATUS PATIENT QL REPORTED: NORMAL
GFR SERPLBLD CREATININE-BSD FMLA CKD-EPI: 106 ML/MIN/1.73 M 2
GLOBULIN SER CALC-MCNC: 3.3 G/DL (ref 1.9–3.5)
GLUCOSE SERPL-MCNC: 113 MG/DL (ref 65–99)
HBA1C MFR BLD: 4.8 % (ref 4–5.6)
HCT VFR BLD AUTO: 39.4 % (ref 37–47)
HDLC SERPL-MCNC: 71 MG/DL
HGB BLD-MCNC: 13.4 G/DL (ref 12–16)
IMM GRANULOCYTES # BLD AUTO: 0.02 K/UL (ref 0–0.11)
IMM GRANULOCYTES NFR BLD AUTO: 0.3 % (ref 0–0.9)
LDLC SERPL CALC-MCNC: 123 MG/DL
LYMPHOCYTES # BLD AUTO: 2.87 K/UL (ref 1–4.8)
LYMPHOCYTES NFR BLD: 42.3 % (ref 22–41)
MCH RBC QN AUTO: 30.1 PG (ref 27–33)
MCHC RBC AUTO-ENTMCNC: 34 G/DL (ref 32.2–35.5)
MCV RBC AUTO: 88.5 FL (ref 81.4–97.8)
MONOCYTES # BLD AUTO: 0.4 K/UL (ref 0–0.85)
MONOCYTES NFR BLD AUTO: 5.9 % (ref 0–13.4)
NEUTROPHILS # BLD AUTO: 3.11 K/UL (ref 1.82–7.42)
NEUTROPHILS NFR BLD: 45.8 % (ref 44–72)
NRBC # BLD AUTO: 0 K/UL
NRBC BLD-RTO: 0 /100 WBC (ref 0–0.2)
PLATELET # BLD AUTO: 369 K/UL (ref 164–446)
PMV BLD AUTO: 9.5 FL (ref 9–12.9)
POTASSIUM SERPL-SCNC: 3.9 MMOL/L (ref 3.6–5.5)
PROT SERPL-MCNC: 7.4 G/DL (ref 6–8.2)
RBC # BLD AUTO: 4.45 M/UL (ref 4.2–5.4)
SODIUM SERPL-SCNC: 141 MMOL/L (ref 135–145)
TRIGL SERPL-MCNC: 149 MG/DL (ref 0–149)
TSH SERPL DL<=0.005 MIU/L-ACNC: 1.12 UIU/ML (ref 0.38–5.33)
WBC # BLD AUTO: 6.8 K/UL (ref 4.8–10.8)

## 2024-02-26 PROCEDURE — 84443 ASSAY THYROID STIM HORMONE: CPT

## 2024-02-26 PROCEDURE — 36415 COLL VENOUS BLD VENIPUNCTURE: CPT

## 2024-02-26 PROCEDURE — 80053 COMPREHEN METABOLIC PANEL: CPT

## 2024-02-26 PROCEDURE — 85025 COMPLETE CBC W/AUTO DIFF WBC: CPT

## 2024-02-26 PROCEDURE — 83036 HEMOGLOBIN GLYCOSYLATED A1C: CPT

## 2024-02-26 PROCEDURE — 82652 VIT D 1 25-DIHYDROXY: CPT

## 2024-02-26 PROCEDURE — 80061 LIPID PANEL: CPT

## 2024-02-28 LAB — 1,25(OH)2D3 SERPL-MCNC: 78.7 PG/ML (ref 19.9–79.3)

## 2024-04-25 ENCOUNTER — HOSPITAL ENCOUNTER (OUTPATIENT)
Facility: MEDICAL CENTER | Age: 39
End: 2024-04-25
Payer: COMMERCIAL

## 2024-04-25 PROCEDURE — 88175 CYTOPATH C/V AUTO FLUID REDO: CPT

## 2024-04-29 LAB
COMMENT NL11729A: NORMAL
CYTOLOGIST CVX/VAG CYTO: NORMAL
CYTOLOGY CVX/VAG DOC CYTO: NORMAL
CYTOLOGY CVX/VAG DOC THIN PREP: NORMAL
NOTE NL11727A: NORMAL
OTHER STN SPEC: NORMAL
STAT OF ADQ CVX/VAG CYTO-IMP: NORMAL

## 2024-06-13 ENCOUNTER — OFFICE VISIT (OUTPATIENT)
Dept: URGENT CARE | Facility: CLINIC | Age: 39
End: 2024-06-13
Payer: COMMERCIAL

## 2024-06-13 VITALS
BODY MASS INDEX: 41.15 KG/M2 | DIASTOLIC BLOOD PRESSURE: 82 MMHG | OXYGEN SATURATION: 97 % | WEIGHT: 247 LBS | RESPIRATION RATE: 18 BRPM | TEMPERATURE: 98.9 F | HEART RATE: 100 BPM | HEIGHT: 65 IN | SYSTOLIC BLOOD PRESSURE: 134 MMHG

## 2024-06-13 DIAGNOSIS — Z03.818 ENCOUNTER FOR PATIENT CONCERN ABOUT EXPOSURE TO INFECTIOUS ORGANISM: ICD-10-CM

## 2024-06-13 LAB
FLUAV RNA SPEC QL NAA+PROBE: NEGATIVE
FLUBV RNA SPEC QL NAA+PROBE: NEGATIVE
RSV RNA SPEC QL NAA+PROBE: NEGATIVE
SARS-COV-2 RNA RESP QL NAA+PROBE: NEGATIVE

## 2024-06-13 PROCEDURE — 99203 OFFICE O/P NEW LOW 30 MIN: CPT | Performed by: FAMILY MEDICINE

## 2024-06-13 PROCEDURE — 3075F SYST BP GE 130 - 139MM HG: CPT | Performed by: FAMILY MEDICINE

## 2024-06-13 PROCEDURE — 3079F DIAST BP 80-89 MM HG: CPT | Performed by: FAMILY MEDICINE

## 2024-06-13 PROCEDURE — 0241U POCT CEPHEID COV-2, FLU A/B, RSV - PCR: CPT | Performed by: FAMILY MEDICINE

## 2024-06-13 RX ORDER — LOSARTAN POTASSIUM 50 MG/1
TABLET ORAL
COMMUNITY
Start: 2024-04-19

## 2024-06-13 RX ORDER — BENZONATATE 200 MG/1
200 CAPSULE ORAL 3 TIMES DAILY PRN
Qty: 20 CAPSULE | Refills: 0 | Status: SHIPPED | OUTPATIENT
Start: 2024-06-13

## 2024-06-13 RX ORDER — ESCITALOPRAM OXALATE 10 MG/1
10 TABLET ORAL
COMMUNITY

## 2024-06-13 ASSESSMENT — FIBROSIS 4 INDEX: FIB4 SCORE: 0.58

## 2024-06-13 NOTE — PROGRESS NOTES
"  Subjective:      39 y.o. female presents to urgent care for cold symptoms that started Saturday. She is experiencing headache, body ache, cough, and diarrhea. No fever or sore throat. No tobacco product use. No history of asthma or COPD. She is not vaccinated against COVID. Her  is currently sick with similar symptoms.     She denies any other questions or concerns at this time.    Current problem list, medication, and past medical/surgical history were reviewed in Epic.    ROS  See HPI     Objective:      /82 (BP Location: Left arm, Patient Position: Sitting, BP Cuff Size: Adult)   Pulse 100   Temp 37.2 °C (98.9 °F) (Temporal)   Resp 18   Ht 1.651 m (5' 5\")   Wt 112 kg (247 lb)   SpO2 97%   Breastfeeding No   BMI 41.10 kg/m²     Physical Exam  Constitutional:       General: She is not in acute distress.     Appearance: She is not diaphoretic.   HENT:      Right Ear: Tympanic membrane, ear canal and external ear normal.      Left Ear: Tympanic membrane, ear canal and external ear normal.      Mouth/Throat:      Tongue: Tongue does not deviate from midline.      Palate: No lesions.      Pharynx: Uvula midline. No oropharyngeal exudate or posterior oropharyngeal erythema.      Tonsils: No tonsillar exudate. 1+ on the right. 1+ on the left.   Cardiovascular:      Rate and Rhythm: Normal rate and regular rhythm.      Heart sounds: Normal heart sounds.   Pulmonary:      Effort: Pulmonary effort is normal. No respiratory distress.      Breath sounds: Normal breath sounds.   Neurological:      Mental Status: She is alert.   Psychiatric:         Mood and Affect: Affect normal.         Judgment: Judgment normal.       Assessment/Plan:     1. Encounter for patient concern about exposure to infectious organism  Negative COVID, influenza, and RSV. Symptoms are still most consistent with a virus.  Tylenol, ibuprofen, rest, and hydration as needed.  - POCT CEPHEID COV-2, FLU A/B, RSV - PCR  - benzonatate " (TESSALON) 200 MG capsule; Take 1 Capsule by mouth 3 times a day as needed for Cough.  Dispense: 20 Capsule; Refill: 0        Instructed to return to Urgent Care or nearest Emergency Department if symptoms fail to improve, for any change in condition, further concerns, or new concerning symptoms. Patient states understanding of the plan of care and discharge instructions.    Lesli Salmeron M.D.

## 2025-05-22 ENCOUNTER — HOSPITAL ENCOUNTER (OUTPATIENT)
Facility: MEDICAL CENTER | Age: 40
End: 2025-05-22
Payer: COMMERCIAL

## 2025-05-29 ENCOUNTER — OFFICE VISIT (OUTPATIENT)
Dept: URGENT CARE | Facility: CLINIC | Age: 40
End: 2025-05-29
Payer: COMMERCIAL

## 2025-05-29 VITALS
OXYGEN SATURATION: 98 % | HEIGHT: 65 IN | TEMPERATURE: 98 F | SYSTOLIC BLOOD PRESSURE: 138 MMHG | BODY MASS INDEX: 42.95 KG/M2 | WEIGHT: 257.8 LBS | DIASTOLIC BLOOD PRESSURE: 92 MMHG | HEART RATE: 89 BPM

## 2025-05-29 DIAGNOSIS — J06.9 VIRAL URI WITH COUGH: Primary | ICD-10-CM

## 2025-05-29 RX ORDER — DROSPIRENONE AND ETHINYL ESTRADIOL 0.03MG-3MG
1 KIT ORAL
COMMUNITY
Start: 2025-03-21

## 2025-05-29 ASSESSMENT — FIBROSIS 4 INDEX: FIB4 SCORE: 0.59

## 2025-05-29 NOTE — PROGRESS NOTES
"Subjective:   Ericka Flores is a 40 y.o. female who presents for Cough (X1 wk)      HPI  The patient presents to the Urgent Care with complaints of a cough for 1 week.  She is primarily here for a note to return to work.  She works at a school.  She had a sore throat that did improve.  Cough is lingering but not worsening.  Coughing some phlegm in the morning. Some fatigue the last couple days. OTC medications with some relief. Still some nasal congestion and post nasal drip.  Denies any fever, chills, body aches, chest pain, SOB, vomiting, diarrhea. Nonsmoker.       Past Medical History[1]  Allergies[2]     Objective:     BP (!) 138/92 (BP Location: Left arm, Patient Position: Sitting, BP Cuff Size: Large adult)   Pulse 89   Temp 36.7 °C (98 °F) (Temporal)   Ht 1.651 m (5' 5\")   Wt 117 kg (257 lb 12.8 oz)   SpO2 98%   BMI 42.90 kg/m²     Physical Exam  Vitals reviewed.   Constitutional:       General: She is not in acute distress.     Appearance: Normal appearance. She is not ill-appearing or toxic-appearing.   HENT:      Mouth/Throat:      Mouth: Mucous membranes are moist.      Pharynx: Uvula midline. Posterior oropharyngeal erythema (trace) present. No pharyngeal swelling, oropharyngeal exudate or uvula swelling.      Tonsils: No tonsillar exudate or tonsillar abscesses.   Eyes:      Conjunctiva/sclera: Conjunctivae normal.   Cardiovascular:      Rate and Rhythm: Normal rate and regular rhythm.      Heart sounds: Normal heart sounds.   Pulmonary:      Effort: Pulmonary effort is normal. No respiratory distress.      Breath sounds: Normal breath sounds. No wheezing, rhonchi or rales.   Musculoskeletal:      Cervical back: Neck supple. No rigidity.   Lymphadenopathy:      Cervical: No cervical adenopathy.   Skin:     General: Skin is warm and dry.   Neurological:      General: No focal deficit present.      Mental Status: She is alert and oriented to person, place, and time.   Psychiatric:         Mood " and Affect: Mood normal.         Behavior: Behavior normal.         Diagnosis and associated orders:     1. Viral URI with cough (Primary)         Comments/MDM:     The patient's presenting symptoms and exam findings are consistent with a upper respiratory infection most likely viral etiology. They have a normal pulse oximetry on room air, afebrile, and a normal pulmonary exam. Overall, the patient is very well appearing. I do not feel that this patient would benefit from antibiotics at this time.   Recommended symptomatic and supportive care at this time that includes plenty of fluids, rest, Tylenol/Ibuprofen for pain/fever, warm salt water gargles for sore throat, OTC cough and decongestant medication, Flonase, nasal saline washes. If no improvement in 5-7 days or any worsening symptoms, recommend returning to the urgent care for re-evaluation.        I personally reviewed prior external notes and test results pertinent to today's visit. Pathogenesis of diagnosis discussed including typical length and natural progression. Supportive care, natural history, differential diagnoses, and indications for immediate follow-up discussed. Patient expresses understanding and agrees to plan. Patient denies any other questions or concerns.     Follow-up with the primary care physician for recheck, reevaluation, and consideration of further management.    Please note that this dictation was created using voice recognition software. I have made a reasonable attempt to correct obvious errors, but I expect that there are errors of grammar and possibly content that I did not discover before finalizing the note.    This note was electronically signed by Calderon Huggins PA-C         [1]   Past Medical History:  Diagnosis Date    Bowel habit changes     constipation    Heart burn     Hypertension     Snoring    [2]   Allergies  Allergen Reactions    Seasonal

## 2025-05-30 LAB
HPV I/H RISK 1 DNA SPEC QL NAA+PROBE: NOT DETECTED
SPECIMEN SOURCE: NORMAL
THINPREP PAP, CYTOLOGY NL11781: NORMAL

## 2025-08-02 ENCOUNTER — OFFICE VISIT (OUTPATIENT)
Dept: URGENT CARE | Facility: CLINIC | Age: 40
End: 2025-08-02
Payer: COMMERCIAL

## 2025-08-02 VITALS
SYSTOLIC BLOOD PRESSURE: 130 MMHG | RESPIRATION RATE: 20 BRPM | BODY MASS INDEX: 41.32 KG/M2 | OXYGEN SATURATION: 97 % | WEIGHT: 248 LBS | HEART RATE: 91 BPM | TEMPERATURE: 97.6 F | HEIGHT: 65 IN | DIASTOLIC BLOOD PRESSURE: 82 MMHG

## 2025-08-02 DIAGNOSIS — U07.1 COVID: Primary | ICD-10-CM

## 2025-08-02 DIAGNOSIS — R49.0 HOARSENESS OF VOICE: ICD-10-CM

## 2025-08-02 PROCEDURE — 99213 OFFICE O/P EST LOW 20 MIN: CPT | Performed by: FAMILY MEDICINE

## 2025-08-02 PROCEDURE — 3075F SYST BP GE 130 - 139MM HG: CPT | Performed by: FAMILY MEDICINE

## 2025-08-02 PROCEDURE — 3079F DIAST BP 80-89 MM HG: CPT | Performed by: FAMILY MEDICINE

## 2025-08-02 ASSESSMENT — FIBROSIS 4 INDEX: FIB4 SCORE: 0.59

## 2025-08-02 ASSESSMENT — ENCOUNTER SYMPTOMS
CARDIOVASCULAR NEGATIVE: 1
GASTROINTESTINAL NEGATIVE: 1
RESPIRATORY NEGATIVE: 1
EYES NEGATIVE: 1
SORE THROAT: 1
CONSTITUTIONAL NEGATIVE: 1

## 2025-08-27 ENCOUNTER — NON-PROVIDER VISIT (OUTPATIENT)
Dept: URGENT CARE | Facility: CLINIC | Age: 40
End: 2025-08-27

## 2025-08-27 DIAGNOSIS — Z11.1 PPD SCREENING TEST: Primary | ICD-10-CM

## (undated) DEVICE — TUBE CONNECTING SUCTION - CLEAR PLASTIC STERILE 72 IN (50EA/CA)

## (undated) DEVICE — SET IRRIGATION CYSTOSCOPY TUBE L80 IN (20EA/CA)

## (undated) DEVICE — GLOVE BIOGEL SZ 6.5 SURGICAL PF LTX (50PR/BX 4BX/CA)

## (undated) DEVICE — KIT  I.V. START (100EA/CA)

## (undated) DEVICE — GLOVE BIOGEL SZ 8.5 SURGICAL PF LTX - (50PR/BX 4BX/CA)

## (undated) DEVICE — LACTATED RINGERS INJ 1000 ML - (14EA/CA 60CA/PF)

## (undated) DEVICE — BRIEF STRETCH MATERNITY M/L - FITS 20-60IN (5EA/BG 20BG/CA)

## (undated) DEVICE — TUBING D & E COLLECTION SET (50EA/PK)

## (undated) DEVICE — TRAY SRGPRP PVP IOD WT PRP - (20/CA)

## (undated) DEVICE — GOWN WARMING STANDARD FLEX - (30/CA)

## (undated) DEVICE — NEEDLE SPINAL NON-SAFETY 22 GA X 3 (25EA/BX)"

## (undated) DEVICE — DRESSING NON ADHERENT 3 X 4 - STERILE (100/BX 12BX/CA)

## (undated) DEVICE — NEPTUNE 4 PORT MANIFOLD - (20/PK)

## (undated) DEVICE — PROTECTOR ULNA NERVE - (36PR/CA)

## (undated) DEVICE — SYRINGE 10 ML CONTROL LL (25EA/BX 4BX/CA)

## (undated) DEVICE — SET LEADWIRE 5 LEAD BEDSIDE DISPOSABLE ECG (1SET OF 5/EA)

## (undated) DEVICE — SODIUM CHL IRRIGATION 0.9% 1000ML (12EA/CA)

## (undated) DEVICE — MASK ANESTHESIA ADULT  - (100/CA)

## (undated) DEVICE — WATER IRRIGATION STERILE 1000ML (12EA/CA)

## (undated) DEVICE — ELECTRODE 850 FOAM ADHESIVE - HYDROGEL RADIOTRNSPRNT (50/PK)

## (undated) DEVICE — PAD SANITARY 11IN MAXI IND WRAPPED  (12EA/PK 24PK/CA)

## (undated) DEVICE — CANISTER SUCTION 3000ML MECHANICAL FILTER AUTO SHUTOFF MEDI-VAC NONSTERILE LF DISP  (40EA/CA)

## (undated) DEVICE — KIT ANESTHESIA W/CIRCUIT & 3/LT BAG W/FILTER (20EA/CA)

## (undated) DEVICE — HEAD HOLDER JUNIOR/ADULT

## (undated) DEVICE — CANISTER SUCTION RIGID RED 1500CC (40EA/CA)

## (undated) DEVICE — SUCTION INSTRUMENT YANKAUER BULBOUS TIP W/O VENT (50EA/CA)

## (undated) DEVICE — GLOVE BIOGEL PI INDICATOR SZ 7.0 SURGICAL PF LF - (50/BX 4BX/CA)

## (undated) DEVICE — SENSOR SPO2 NEO LNCS ADHESIVE (20/BX) SEE USER NOTES

## (undated) DEVICE — Device

## (undated) DEVICE — CATHETER IV 20 GA X 1-1/4 ---SURG.& SDS ONLY--- (50EA/BX)

## (undated) DEVICE — TUBING CLEARLINK DUO-VENT - C-FLO (48EA/CA)

## (undated) DEVICE — ELECTRODE DUAL RETURN W/ CORD - (50/PK)

## (undated) DEVICE — MASK AIRWAY SIZE 3 UNIQUE SILICON (10/BX)